# Patient Record
Sex: MALE | Race: BLACK OR AFRICAN AMERICAN | NOT HISPANIC OR LATINO | URBAN - METROPOLITAN AREA
[De-identification: names, ages, dates, MRNs, and addresses within clinical notes are randomized per-mention and may not be internally consistent; named-entity substitution may affect disease eponyms.]

---

## 2024-04-30 ENCOUNTER — INPATIENT (INPATIENT)
Facility: HOSPITAL | Age: 59
LOS: 6 days | Discharge: HOME | DRG: 177 | End: 2024-05-07
Attending: STUDENT IN AN ORGANIZED HEALTH CARE EDUCATION/TRAINING PROGRAM | Admitting: INTERNAL MEDICINE
Payer: SELF-PAY

## 2024-04-30 VITALS
HEART RATE: 78 BPM | TEMPERATURE: 98 F | WEIGHT: 300.05 LBS | SYSTOLIC BLOOD PRESSURE: 114 MMHG | RESPIRATION RATE: 27 BRPM | DIASTOLIC BLOOD PRESSURE: 61 MMHG | OXYGEN SATURATION: 76 %

## 2024-04-30 DIAGNOSIS — N28.1 CYST OF KIDNEY, ACQUIRED: ICD-10-CM

## 2024-04-30 DIAGNOSIS — I10 ESSENTIAL (PRIMARY) HYPERTENSION: ICD-10-CM

## 2024-04-30 DIAGNOSIS — J45.901 UNSPECIFIED ASTHMA WITH (ACUTE) EXACERBATION: ICD-10-CM

## 2024-04-30 DIAGNOSIS — T39.395A ADVERSE EFFECT OF OTHER NONSTEROIDAL ANTI-INFLAMMATORY DRUGS [NSAID], INITIAL ENCOUNTER: ICD-10-CM

## 2024-04-30 DIAGNOSIS — N17.9 ACUTE KIDNEY FAILURE, UNSPECIFIED: ICD-10-CM

## 2024-04-30 DIAGNOSIS — D50.9 IRON DEFICIENCY ANEMIA, UNSPECIFIED: ICD-10-CM

## 2024-04-30 DIAGNOSIS — E78.5 HYPERLIPIDEMIA, UNSPECIFIED: ICD-10-CM

## 2024-04-30 DIAGNOSIS — E87.29 OTHER ACIDOSIS: ICD-10-CM

## 2024-04-30 DIAGNOSIS — D72.10 EOSINOPHILIA, UNSPECIFIED: ICD-10-CM

## 2024-04-30 DIAGNOSIS — J18.8 OTHER PNEUMONIA, UNSPECIFIED ORGANISM: ICD-10-CM

## 2024-04-30 DIAGNOSIS — E66.2 MORBID (SEVERE) OBESITY WITH ALVEOLAR HYPOVENTILATION: ICD-10-CM

## 2024-04-30 DIAGNOSIS — J15.69 PNEUMONIA DUE TO OTHER GRAM-NEGATIVE BACTERIA: ICD-10-CM

## 2024-04-30 DIAGNOSIS — Z87.891 PERSONAL HISTORY OF NICOTINE DEPENDENCE: ICD-10-CM

## 2024-04-30 DIAGNOSIS — I27.20 PULMONARY HYPERTENSION, UNSPECIFIED: ICD-10-CM

## 2024-04-30 DIAGNOSIS — J96.02 ACUTE RESPIRATORY FAILURE WITH HYPERCAPNIA: ICD-10-CM

## 2024-04-30 DIAGNOSIS — J96.01 ACUTE RESPIRATORY FAILURE WITH HYPOXIA: ICD-10-CM

## 2024-04-30 LAB
ALBUMIN SERPL ELPH-MCNC: 3.5 G/DL — SIGNIFICANT CHANGE UP (ref 3.4–5)
ALP SERPL-CCNC: 82 U/L — SIGNIFICANT CHANGE UP (ref 40–120)
ALT FLD-CCNC: 6 U/L — LOW (ref 12–42)
ANION GAP SERPL CALC-SCNC: 5 MMOL/L — LOW (ref 9–16)
ANISOCYTOSIS BLD QL: SIGNIFICANT CHANGE UP
APTT BLD: 28.2 SEC — SIGNIFICANT CHANGE UP (ref 24.5–35.6)
AST SERPL-CCNC: 16 U/L — SIGNIFICANT CHANGE UP (ref 15–37)
BASOPHILS # BLD AUTO: 0.08 K/UL — SIGNIFICANT CHANGE UP (ref 0–0.2)
BASOPHILS NFR BLD AUTO: 0.7 % — SIGNIFICANT CHANGE UP (ref 0–2)
BILIRUB SERPL-MCNC: 0.6 MG/DL — SIGNIFICANT CHANGE UP (ref 0.2–1.2)
BUN SERPL-MCNC: 17 MG/DL — SIGNIFICANT CHANGE UP (ref 7–23)
CALCIUM SERPL-MCNC: 8.7 MG/DL — SIGNIFICANT CHANGE UP (ref 8.5–10.5)
CHLORIDE SERPL-SCNC: 102 MMOL/L — SIGNIFICANT CHANGE UP (ref 96–108)
CO2 SERPL-SCNC: 33 MMOL/L — HIGH (ref 22–31)
CREAT SERPL-MCNC: 1.59 MG/DL — HIGH (ref 0.5–1.3)
DACRYOCYTES BLD QL SMEAR: SLIGHT — SIGNIFICANT CHANGE UP
EGFR: 49 ML/MIN/1.73M2 — LOW
ELLIPTOCYTES BLD QL SMEAR: SIGNIFICANT CHANGE UP
EOSINOPHIL # BLD AUTO: 0.94 K/UL — HIGH (ref 0–0.5)
EOSINOPHIL NFR BLD AUTO: 7.8 % — HIGH (ref 0–6)
FLUAV AG NPH QL: SIGNIFICANT CHANGE UP
FLUBV AG NPH QL: SIGNIFICANT CHANGE UP
GLUCOSE SERPL-MCNC: 110 MG/DL — HIGH (ref 70–99)
HCT VFR BLD CALC: 31.2 % — LOW (ref 39–50)
HGB BLD-MCNC: 7.7 G/DL — LOW (ref 13–17)
HYPOCHROMIA BLD QL: SLIGHT — SIGNIFICANT CHANGE UP
IMM GRANULOCYTES NFR BLD AUTO: 0.3 % — SIGNIFICANT CHANGE UP (ref 0–0.9)
INR BLD: 1.16 — SIGNIFICANT CHANGE UP (ref 0.85–1.18)
LACTATE BLDV-MCNC: 0.9 MMOL/L — SIGNIFICANT CHANGE UP (ref 0.5–2)
LACTATE BLDV-MCNC: 0.9 MMOL/L — SIGNIFICANT CHANGE UP (ref 0.5–2)
LYMPHOCYTES # BLD AUTO: 1.26 K/UL — SIGNIFICANT CHANGE UP (ref 1–3.3)
LYMPHOCYTES # BLD AUTO: 10.5 % — LOW (ref 13–44)
MAGNESIUM SERPL-MCNC: 2.5 MG/DL — SIGNIFICANT CHANGE UP (ref 1.6–2.6)
MANUAL SMEAR VERIFICATION: SIGNIFICANT CHANGE UP
MCHC RBC-ENTMCNC: 16 PG — LOW (ref 27–34)
MCHC RBC-ENTMCNC: 24.7 GM/DL — LOW (ref 32–36)
MCV RBC AUTO: 65 FL — LOW (ref 80–100)
MONOCYTES # BLD AUTO: 1.15 K/UL — HIGH (ref 0–0.9)
MONOCYTES NFR BLD AUTO: 9.6 % — SIGNIFICANT CHANGE UP (ref 2–14)
NEUTROPHILS # BLD AUTO: 8.52 K/UL — HIGH (ref 1.8–7.4)
NEUTROPHILS NFR BLD AUTO: 71.1 % — SIGNIFICANT CHANGE UP (ref 43–77)
NRBC # BLD: 0 /100 WBCS — SIGNIFICANT CHANGE UP (ref 0–0)
NT-PROBNP SERPL-SCNC: 323 PG/ML — HIGH
PCO2 BLDV: 68 MMHG — HIGH (ref 42–55)
PCO2 BLDV: 69 MMHG — HIGH (ref 42–55)
PCO2 BLDV: 75 MMHG — HIGH (ref 42–55)
PCO2 BLDV: 78 MMHG — HIGH (ref 42–55)
PCO2 BLDV: 84 MMHG — HIGH (ref 42–55)
PH BLDV: 7.23 — LOW (ref 7.32–7.43)
PH BLDV: 7.25 — LOW (ref 7.32–7.43)
PH BLDV: 7.26 — LOW (ref 7.32–7.43)
PH BLDV: 7.3 — LOW (ref 7.32–7.43)
PH BLDV: 7.34 — SIGNIFICANT CHANGE UP (ref 7.32–7.43)
PLAT MORPH BLD: NORMAL — SIGNIFICANT CHANGE UP
PLATELET # BLD AUTO: 223 K/UL — SIGNIFICANT CHANGE UP (ref 150–400)
PO2 BLDV: 37 MMHG — SIGNIFICANT CHANGE UP (ref 25–45)
PO2 BLDV: 46 MMHG — HIGH (ref 25–45)
PO2 BLDV: 46 MMHG — HIGH (ref 25–45)
PO2 BLDV: 71 MMHG — HIGH (ref 25–45)
PO2 BLDV: 84 MMHG — HIGH (ref 25–45)
POTASSIUM SERPL-MCNC: 3.4 MMOL/L — LOW (ref 3.5–5.3)
POTASSIUM SERPL-SCNC: 3.4 MMOL/L — LOW (ref 3.5–5.3)
PROT SERPL-MCNC: 8 G/DL — SIGNIFICANT CHANGE UP (ref 6.4–8.2)
PROTHROM AB SERPL-ACNC: 12.7 SEC — SIGNIFICANT CHANGE UP (ref 9.5–13)
RBC # BLD: 4.8 M/UL — SIGNIFICANT CHANGE UP (ref 4.2–5.8)
RBC # FLD: 25.1 % — HIGH (ref 10.3–14.5)
RBC BLD AUTO: ABNORMAL
RSV RNA NPH QL NAA+NON-PROBE: SIGNIFICANT CHANGE UP
SAO2 % BLDV: 62.7 % — LOW (ref 67–88)
SAO2 % BLDV: 72 % — SIGNIFICANT CHANGE UP (ref 67–88)
SAO2 % BLDV: 74.5 % — SIGNIFICANT CHANGE UP (ref 67–88)
SAO2 % BLDV: 92.7 % — HIGH (ref 67–88)
SAO2 % BLDV: 97 % — HIGH (ref 67–88)
SARS-COV-2 RNA SPEC QL NAA+PROBE: SIGNIFICANT CHANGE UP
SODIUM SERPL-SCNC: 140 MMOL/L — SIGNIFICANT CHANGE UP (ref 132–145)
TROPONIN I, HIGH SENSITIVITY RESULT: 15.7 NG/L — SIGNIFICANT CHANGE UP
WBC # BLD: 11.99 K/UL — HIGH (ref 3.8–10.5)
WBC # FLD AUTO: 11.99 K/UL — HIGH (ref 3.8–10.5)

## 2024-04-30 PROCEDURE — 99285 EMERGENCY DEPT VISIT HI MDM: CPT

## 2024-04-30 PROCEDURE — G0508: CPT

## 2024-04-30 PROCEDURE — 71275 CT ANGIOGRAPHY CHEST: CPT | Mod: 26,MC

## 2024-04-30 PROCEDURE — 71045 X-RAY EXAM CHEST 1 VIEW: CPT | Mod: 26

## 2024-04-30 RX ORDER — IPRATROPIUM/ALBUTEROL SULFATE 18-103MCG
3 AEROSOL WITH ADAPTER (GRAM) INHALATION ONCE
Refills: 0 | Status: DISCONTINUED | OUTPATIENT
Start: 2024-04-30 | End: 2024-04-30

## 2024-04-30 RX ORDER — ALBUTEROL 90 UG/1
2.5 AEROSOL, METERED ORAL
Refills: 0 | Status: COMPLETED | OUTPATIENT
Start: 2024-04-30 | End: 2024-04-30

## 2024-04-30 RX ORDER — FUROSEMIDE 40 MG
40 TABLET ORAL ONCE
Refills: 0 | Status: COMPLETED | OUTPATIENT
Start: 2024-04-30 | End: 2024-04-30

## 2024-04-30 RX ORDER — CEFTRIAXONE 500 MG/1
1000 INJECTION, POWDER, FOR SOLUTION INTRAMUSCULAR; INTRAVENOUS ONCE
Refills: 0 | Status: COMPLETED | OUTPATIENT
Start: 2024-04-30 | End: 2024-04-30

## 2024-04-30 RX ORDER — IPRATROPIUM/ALBUTEROL SULFATE 18-103MCG
3 AEROSOL WITH ADAPTER (GRAM) INHALATION
Refills: 0 | Status: COMPLETED | OUTPATIENT
Start: 2024-04-30 | End: 2024-04-30

## 2024-04-30 RX ORDER — ALBUTEROL 90 UG/1
2.5 AEROSOL, METERED ORAL ONCE
Refills: 0 | Status: DISCONTINUED | OUTPATIENT
Start: 2024-04-30 | End: 2024-04-30

## 2024-04-30 RX ORDER — AZITHROMYCIN 500 MG/1
500 TABLET, FILM COATED ORAL ONCE
Refills: 0 | Status: COMPLETED | OUTPATIENT
Start: 2024-04-30 | End: 2024-04-30

## 2024-04-30 RX ADMIN — ALBUTEROL 2.5 MILLIGRAM(S): 90 AEROSOL, METERED ORAL at 13:42

## 2024-04-30 RX ADMIN — AZITHROMYCIN 255 MILLIGRAM(S): 500 TABLET, FILM COATED ORAL at 18:13

## 2024-04-30 RX ADMIN — ALBUTEROL 2.5 MILLIGRAM(S): 90 AEROSOL, METERED ORAL at 15:01

## 2024-04-30 RX ADMIN — Medication 2 MILLILITER(S): at 16:20

## 2024-04-30 RX ADMIN — CEFTRIAXONE 100 MILLIGRAM(S): 500 INJECTION, POWDER, FOR SOLUTION INTRAMUSCULAR; INTRAVENOUS at 17:34

## 2024-04-30 RX ADMIN — ALBUTEROL 2.5 MILLIGRAM(S): 90 AEROSOL, METERED ORAL at 14:30

## 2024-04-30 RX ADMIN — ALBUTEROL 2.5 MILLIGRAM(S): 90 AEROSOL, METERED ORAL at 14:04

## 2024-04-30 RX ADMIN — CEFTRIAXONE 1000 MILLIGRAM(S): 500 INJECTION, POWDER, FOR SOLUTION INTRAMUSCULAR; INTRAVENOUS at 18:15

## 2024-04-30 RX ADMIN — ALBUTEROL 2.5 MILLIGRAM(S): 90 AEROSOL, METERED ORAL at 15:24

## 2024-04-30 RX ADMIN — ALBUTEROL 2.5 MILLIGRAM(S): 90 AEROSOL, METERED ORAL at 15:18

## 2024-04-30 RX ADMIN — Medication 40 MILLIGRAM(S): at 16:45

## 2024-04-30 RX ADMIN — Medication 2 MILLILITER(S): at 16:36

## 2024-04-30 NOTE — ED ADULT NURSE REASSESSMENT NOTE - NS ED NURSE REASSESS COMMENT FT1
Pt is A&Ox4 and shows improvement in neurological status at this time. Pt is awake and states having urge to urinate at this time; pt offered urinal. Respirations equal and unlabored at this time.

## 2024-04-30 NOTE — ED ADULT NURSE REASSESSMENT NOTE - NS ED NURSE REASSESS COMMENT FT1
Pt received from DAYANARA Alston. Pt is A&Ox4 at this time but sleepy. Pt maintained on BIPAP and tele-ICU monitoring. Respirations equal and unlabored. VSS

## 2024-04-30 NOTE — ED PROVIDER NOTE - PROGRESS NOTE DETAILS
Patient reevaluated at the bedside, gas is improved, patient is awake, opening eyes spontaneously, was able to speak a few words, confirming that he is feeling better, nodding his head.  Following commands.  Vitals are stable.  Patient is stable for stepdown.

## 2024-04-30 NOTE — ED ADULT NURSE REASSESSMENT NOTE - NS ED NURSE REASSESS COMMENT FT1
pt report received from DAYANARA Yun, PT sleeping in stretcher, even rise and fall of chest. Pt pending admission to ICU. Tele icu huddle given by RN Court Jo  and currently utilizing tele-ICU for pt monitoring. Pt remains on BiPAP; settings recorded. VSS pending repeat blood work at 1830.  Care ongoing.

## 2024-04-30 NOTE — PROVIDER CONTACT NOTE (EICU) - ACTION/TREATMENT ORDERED:
Follow up in 30 minutes with ABG. Follow up in 30 minutes with ABG.  This patient is critically ill and required frequent bedside visits with therapy change. I have personally provided 60 minutes of critical care time. This time excludes spent teaching. Service and consultation provided via TeleICU service. This provider was located at [facility] and support provided remotely.

## 2024-04-30 NOTE — ED PROVIDER NOTE - CLINICAL SUMMARY MEDICAL DECISION MAKING FREE TEXT BOX
Patient here with history of asthma and long-term smoking history now coming in for diffuse wheezing and worsening asthma symptoms over the last several days requiring nebulizers, steroids, magnesium in the field.  Patient requiring multiple rounds of nebulizers here in the emergency room.  Patient becoming more somnolent later in the shift with hypercapnia on repeat VBG.  Requiring BiPAP support.  Endorsed to MICU team advised on BiPAP settings advised reassess with adjusted BiPAP settings.  eICU involved as well for optimization of BiPAP settings.  Patient pending VBG

## 2024-04-30 NOTE — ED PROVIDER NOTE - ATTENDING APP SHARED VISIT CONTRIBUTION OF CARE
The patient was seen immediately upon arrival due to a high probability of imminent or life-threatening deterioration secondary to COPD exacerbation with retention, which required my direct attention, intervention, and personal management at the bedside. I have personally provided critical care time exclusive of time spent on separately billable procedures. Time includes review of laboratory data, radiology results, discussion with consultants, discussion with the patient's family, and monitoring for potential decompensation.

## 2024-04-30 NOTE — ED ADULT NURSE NOTE - CHIEF COMPLAINT QUOTE
Family at bedside. Updated on status. Pt requesting discharge. Notified waiting for radiology report. here for sob, wheezing and productive cough - was found to be hypoxic - was given 12mg of Decadron, 2 grams of Magnesium, 2 Duo Nebs- pt is hypoxic on arrival  and is 80% on RA.

## 2024-04-30 NOTE — ED ADULT NURSE NOTE - OBJECTIVE STATEMENT
Pt presents to ED A&Ox4 with c/o SOB. Pt is  for [place] and reports experiencing SOB while walking during work. PMH bronchitis as per pt; prescribed albuterol inhaler. Pt reports using inhaler x3 with no relief and calling 911. Denies fever/chills. Pt given IV magnesium, steroids, and nebulizer treatments during EMS transport. Pt Pt presents to ED A&Ox4 with c/o SOB. Pt is  for [place] [employer] and reports experiencing SOB while walking during work. PMH bronchitis as per pt; prescribed albuterol inhaler. Pt reports using inhaler x3 with no relief and calling 911. Denies fever/chills. Pt given IV magnesium, steroids, and nebulizer treatments during EMS transport. Pt respirations equal but labored. Increased work of breathing and retractions noted. Skin is warm, dry, and diaphoretic.

## 2024-04-30 NOTE — PROVIDER CONTACT NOTE (EICU) - RECOMMENDATIONS
In discussion with the primary team recommended switching over modalities to AVAPS.  Set tidal volume to 500 mL based on patient's ideal body weight.  Repeat arterial blood gas in 30 minutes to reassess.  Given the patient's elevated bicarbonate on he still maintains a mental status most likely the patient is a chronic retainer, and we should aim for a higher pCO2 goal for him.  CT chest also shows a right lower lobe consolidation most likely due to pneumonia.  Will continue with antibiotics and airway clearance and bronchodilators.

## 2024-04-30 NOTE — PROVIDER CONTACT NOTE (EICU) - BACKGROUND
59-year-old male presenting with shortness of breath wheezing and productive cough.  Found to be in hypoxic respiratory failure and placed on BiPAP for increased work of breathing.  VBG found the patient to be acidotic with an elevated pCO2 of 68.  On repeat gases patient's carbon dioxide continue to rise despite being on noninvasive therapy with bilevel ventilation.

## 2024-04-30 NOTE — ED PROVIDER NOTE - OBJECTIVE STATEMENT
59-year-old male history of asthma now coming in for several days of cough and congestion stating his asthma has been worsening.  Denies any history of intubations.  History of smoking.  Denies nausea, vomiting, fevers, chills.  Given nebulizers, steroids and magnesium in the field

## 2024-04-30 NOTE — ED ADULT TRIAGE NOTE - NS ED NURSE BANDS TYPE
Final Anesthesia Post-op Assessment    Patient: Iza Morejon  Procedure(s) Performed: DRAINAGE OF SEPTO HEMATOMA  Anesthesia type: General    Vital Last Value   Temperature 36.9 °C (98.4 °F) (12/24/18 1653)   Pulse 117 (12/24/18 1653)   Respiratory Rate (P) 16 (12/24/18 1700)   Non-Invasive   Blood Pressure (P) 140/78 (12/24/18 1700)   Arterial  Blood Pressure     Pulse Oximetry 99 % (12/24/18 1653)     Last 24 I/O:     Intake/Output Summary (Last 24 hours) at 12/24/2018 1703  Last data filed at 12/24/2018 1640  Gross per 24 hour   Intake 1000 ml   Output --   Net 1000 ml       PATIENT LOCATION: PACU Phase 1  POST-OP VITAL SIGNS: stable  LEVEL OF CONSCIOUSNESS: participates in exam, awake, oriented, answers questions appropriately and alert  RESPIRATORY STATUS: spontaneous ventilation  CARDIOVASCULAR: stable  HYDRATION: euvolemic    PAIN MANAGEMENT: adequately controlled  NAUSEA: None  AIRWAY PATENCY: patent  POST-OP ASSESSMENT: no complications, patient tolerated procedure well with no complications, no evidence of recall and sufficiently recovered from acute administration of anesthesia effects and able to participate in evaluation  COMPLICATIONS: none  HANDOFF:  Handoff to receiving nurse was performed and questions were answered       Name band;

## 2024-04-30 NOTE — ED PROVIDER NOTE - CARE PLAN
1 Principal Discharge DX:	COPD exacerbation   Principal Discharge DX:	COPD exacerbation  Secondary Diagnosis:	CO2 retention

## 2024-04-30 NOTE — ED ADULT NURSE REASSESSMENT NOTE - NS ED NURSE REASSESS COMMENT FT1
Tele-ICU consult performed with Dr. Hadley at bedside and BIPAP settings changed. Pt placed on constant monitoring with tele-ICU nurse.

## 2024-04-30 NOTE — ED ADULT TRIAGE NOTE - CHIEF COMPLAINT QUOTE
here for sob, wheezing and productive cough - was found to be hypoxic - was given 12mg of Decadron, 2 grams of Magnesium, 2 Duo Nebs- pt is hypoxic on arrival  and is 80% on RA.

## 2024-05-01 DIAGNOSIS — I10 ESSENTIAL (PRIMARY) HYPERTENSION: ICD-10-CM

## 2024-05-01 DIAGNOSIS — Z29.9 ENCOUNTER FOR PROPHYLACTIC MEASURES, UNSPECIFIED: ICD-10-CM

## 2024-05-01 DIAGNOSIS — N17.9 ACUTE KIDNEY FAILURE, UNSPECIFIED: ICD-10-CM

## 2024-05-01 DIAGNOSIS — J45.901 UNSPECIFIED ASTHMA WITH (ACUTE) EXACERBATION: ICD-10-CM

## 2024-05-01 DIAGNOSIS — D64.9 ANEMIA, UNSPECIFIED: ICD-10-CM

## 2024-05-01 DIAGNOSIS — J18.9 PNEUMONIA, UNSPECIFIED ORGANISM: ICD-10-CM

## 2024-05-01 DIAGNOSIS — E78.5 HYPERLIPIDEMIA, UNSPECIFIED: ICD-10-CM

## 2024-05-01 LAB
ADD ON TEST-SPECIMEN IN LAB: SIGNIFICANT CHANGE UP
ALBUMIN SERPL ELPH-MCNC: 3.8 G/DL — SIGNIFICANT CHANGE UP (ref 3.3–5)
ALP SERPL-CCNC: 82 U/L — SIGNIFICANT CHANGE UP (ref 40–120)
ALT FLD-CCNC: 7 U/L — LOW (ref 10–45)
ANION GAP SERPL CALC-SCNC: 11 MMOL/L — SIGNIFICANT CHANGE UP (ref 5–17)
ANION GAP SERPL CALC-SCNC: 11 MMOL/L — SIGNIFICANT CHANGE UP (ref 5–17)
ANION GAP SERPL CALC-SCNC: 12 MMOL/L — SIGNIFICANT CHANGE UP (ref 5–17)
ANISOCYTOSIS BLD QL: SIGNIFICANT CHANGE UP
APPEARANCE UR: CLEAR — SIGNIFICANT CHANGE UP
AST SERPL-CCNC: 15 U/L — SIGNIFICANT CHANGE UP (ref 10–40)
BACTERIA # UR AUTO: NEGATIVE /HPF — SIGNIFICANT CHANGE UP
BASE EXCESS BLDA CALC-SCNC: 4.7 MMOL/L — HIGH (ref -2–3)
BASOPHILS # BLD AUTO: 0 K/UL — SIGNIFICANT CHANGE UP (ref 0–0.2)
BASOPHILS # BLD AUTO: 0.02 K/UL — SIGNIFICANT CHANGE UP (ref 0–0.2)
BASOPHILS NFR BLD AUTO: 0 % — SIGNIFICANT CHANGE UP (ref 0–2)
BASOPHILS NFR BLD AUTO: 0.1 % — SIGNIFICANT CHANGE UP (ref 0–2)
BILIRUB SERPL-MCNC: 0.3 MG/DL — SIGNIFICANT CHANGE UP (ref 0.2–1.2)
BILIRUB UR-MCNC: NEGATIVE — SIGNIFICANT CHANGE UP
BLD GP AB SCN SERPL QL: NEGATIVE — SIGNIFICANT CHANGE UP
BUN SERPL-MCNC: 21 MG/DL — SIGNIFICANT CHANGE UP (ref 7–23)
BUN SERPL-MCNC: 22 MG/DL — SIGNIFICANT CHANGE UP (ref 7–23)
BUN SERPL-MCNC: 24 MG/DL — HIGH (ref 7–23)
CALCIUM SERPL-MCNC: 8.8 MG/DL — SIGNIFICANT CHANGE UP (ref 8.4–10.5)
CALCIUM SERPL-MCNC: 8.9 MG/DL — SIGNIFICANT CHANGE UP (ref 8.4–10.5)
CALCIUM SERPL-MCNC: 9 MG/DL — SIGNIFICANT CHANGE UP (ref 8.4–10.5)
CAST: 0 /LPF — SIGNIFICANT CHANGE UP (ref 0–4)
CHLORIDE SERPL-SCNC: 100 MMOL/L — SIGNIFICANT CHANGE UP (ref 96–108)
CHLORIDE SERPL-SCNC: 98 MMOL/L — SIGNIFICANT CHANGE UP (ref 96–108)
CHLORIDE SERPL-SCNC: 99 MMOL/L — SIGNIFICANT CHANGE UP (ref 96–108)
CO2 BLDA-SCNC: 38 MMOL/L — HIGH (ref 19–24)
CO2 SERPL-SCNC: 30 MMOL/L — SIGNIFICANT CHANGE UP (ref 22–31)
CO2 SERPL-SCNC: 30 MMOL/L — SIGNIFICANT CHANGE UP (ref 22–31)
CO2 SERPL-SCNC: 31 MMOL/L — SIGNIFICANT CHANGE UP (ref 22–31)
COLOR SPEC: YELLOW — SIGNIFICANT CHANGE UP
CREAT ?TM UR-MCNC: 16 MG/DL — SIGNIFICANT CHANGE UP
CREAT SERPL-MCNC: 2.06 MG/DL — HIGH (ref 0.5–1.3)
CREAT SERPL-MCNC: 2.06 MG/DL — HIGH (ref 0.5–1.3)
CREAT SERPL-MCNC: 2.09 MG/DL — HIGH (ref 0.5–1.3)
DIFF PNL FLD: NEGATIVE — SIGNIFICANT CHANGE UP
EGFR: 35 ML/MIN/1.73M2 — LOW
EGFR: 36 ML/MIN/1.73M2 — LOW
EGFR: 36 ML/MIN/1.73M2 — LOW
EOSINOPHIL # BLD AUTO: 0 K/UL — SIGNIFICANT CHANGE UP (ref 0–0.5)
EOSINOPHIL # BLD AUTO: 0 K/UL — SIGNIFICANT CHANGE UP (ref 0–0.5)
EOSINOPHIL NFR BLD AUTO: 0 % — SIGNIFICANT CHANGE UP (ref 0–6)
EOSINOPHIL NFR BLD AUTO: 0 % — SIGNIFICANT CHANGE UP (ref 0–6)
FERRITIN SERPL-MCNC: 5 NG/ML — LOW (ref 30–400)
GAS PNL BLDA: SIGNIFICANT CHANGE UP
GLUCOSE BLDC GLUCOMTR-MCNC: 135 MG/DL — HIGH (ref 70–99)
GLUCOSE BLDC GLUCOMTR-MCNC: 143 MG/DL — HIGH (ref 70–99)
GLUCOSE BLDC GLUCOMTR-MCNC: 147 MG/DL — HIGH (ref 70–99)
GLUCOSE BLDC GLUCOMTR-MCNC: 150 MG/DL — HIGH (ref 70–99)
GLUCOSE SERPL-MCNC: 125 MG/DL — HIGH (ref 70–99)
GLUCOSE SERPL-MCNC: 129 MG/DL — HIGH (ref 70–99)
GLUCOSE SERPL-MCNC: 138 MG/DL — HIGH (ref 70–99)
GLUCOSE UR QL: NEGATIVE MG/DL — SIGNIFICANT CHANGE UP
HCO3 BLDA-SCNC: 35 MMOL/L — HIGH (ref 21–28)
HCT VFR BLD CALC: 31.6 % — LOW (ref 39–50)
HCT VFR BLD CALC: 32.1 % — LOW (ref 39–50)
HGB BLD-MCNC: 7.6 G/DL — LOW (ref 13–17)
HGB BLD-MCNC: 7.6 G/DL — LOW (ref 13–17)
HYPOCHROMIA BLD QL: SIGNIFICANT CHANGE UP
IMM GRANULOCYTES NFR BLD AUTO: 1.7 % — HIGH (ref 0–0.9)
IRON SATN MFR SERPL: 17 UG/DL — LOW (ref 45–165)
IRON SATN MFR SERPL: 5 % — LOW (ref 16–55)
KETONES UR-MCNC: NEGATIVE MG/DL — SIGNIFICANT CHANGE UP
LEUKOCYTE ESTERASE UR-ACNC: ABNORMAL
LYMPHOCYTES # BLD AUTO: 0.36 K/UL — LOW (ref 1–3.3)
LYMPHOCYTES # BLD AUTO: 0.39 K/UL — LOW (ref 1–3.3)
LYMPHOCYTES # BLD AUTO: 2.5 % — LOW (ref 13–44)
LYMPHOCYTES # BLD AUTO: 2.6 % — LOW (ref 13–44)
MACROCYTES BLD QL: SLIGHT — SIGNIFICANT CHANGE UP
MAGNESIUM SERPL-MCNC: 2.4 MG/DL — SIGNIFICANT CHANGE UP (ref 1.6–2.6)
MAGNESIUM SERPL-MCNC: 2.5 MG/DL — SIGNIFICANT CHANGE UP (ref 1.6–2.6)
MANUAL SMEAR VERIFICATION: SIGNIFICANT CHANGE UP
MCHC RBC-ENTMCNC: 16 PG — LOW (ref 27–34)
MCHC RBC-ENTMCNC: 16 PG — LOW (ref 27–34)
MCHC RBC-ENTMCNC: 23.7 GM/DL — LOW (ref 32–36)
MCHC RBC-ENTMCNC: 24.1 GM/DL — LOW (ref 32–36)
MCV RBC AUTO: 66.5 FL — LOW (ref 80–100)
MCV RBC AUTO: 67.6 FL — LOW (ref 80–100)
MICROCYTES BLD QL: SIGNIFICANT CHANGE UP
MONOCYTES # BLD AUTO: 0.14 K/UL — SIGNIFICANT CHANGE UP (ref 0–0.9)
MONOCYTES # BLD AUTO: 0.23 K/UL — SIGNIFICANT CHANGE UP (ref 0–0.9)
MONOCYTES NFR BLD AUTO: 0.9 % — LOW (ref 2–14)
MONOCYTES NFR BLD AUTO: 1.6 % — LOW (ref 2–14)
NEUTROPHILS # BLD AUTO: 13.64 K/UL — HIGH (ref 1.8–7.4)
NEUTROPHILS # BLD AUTO: 14.57 K/UL — HIGH (ref 1.8–7.4)
NEUTROPHILS NFR BLD AUTO: 94.1 % — HIGH (ref 43–77)
NEUTROPHILS NFR BLD AUTO: 94.8 % — HIGH (ref 43–77)
NEUTS BAND # BLD: 1.7 % — SIGNIFICANT CHANGE UP (ref 0–8)
NITRITE UR-MCNC: NEGATIVE — SIGNIFICANT CHANGE UP
NRBC # BLD: 0 /100 WBCS — SIGNIFICANT CHANGE UP (ref 0–0)
OVALOCYTES BLD QL SMEAR: SLIGHT — SIGNIFICANT CHANGE UP
PCO2 BLDA: 84 MMHG — CRITICAL HIGH (ref 35–48)
PH BLDA: 7.23 — LOW (ref 7.35–7.45)
PH UR: 6 — SIGNIFICANT CHANGE UP (ref 5–8)
PHOSPHATE SERPL-MCNC: 6.8 MG/DL — HIGH (ref 2.5–4.5)
PLAT MORPH BLD: ABNORMAL
PLATELET # BLD AUTO: 225 K/UL — SIGNIFICANT CHANGE UP (ref 150–400)
PLATELET # BLD AUTO: 233 K/UL — SIGNIFICANT CHANGE UP (ref 150–400)
PO2 BLDA: 84 MMHG — SIGNIFICANT CHANGE UP (ref 83–108)
POIKILOCYTOSIS BLD QL AUTO: SLIGHT — SIGNIFICANT CHANGE UP
POLYCHROMASIA BLD QL SMEAR: SLIGHT — SIGNIFICANT CHANGE UP
POTASSIUM SERPL-MCNC: 4.1 MMOL/L — SIGNIFICANT CHANGE UP (ref 3.5–5.3)
POTASSIUM SERPL-MCNC: 4.5 MMOL/L — SIGNIFICANT CHANGE UP (ref 3.5–5.3)
POTASSIUM SERPL-MCNC: 4.6 MMOL/L — SIGNIFICANT CHANGE UP (ref 3.5–5.3)
POTASSIUM SERPL-SCNC: 4.1 MMOL/L — SIGNIFICANT CHANGE UP (ref 3.5–5.3)
POTASSIUM SERPL-SCNC: 4.5 MMOL/L — SIGNIFICANT CHANGE UP (ref 3.5–5.3)
POTASSIUM SERPL-SCNC: 4.6 MMOL/L — SIGNIFICANT CHANGE UP (ref 3.5–5.3)
POTASSIUM UR-SCNC: 3 MMOL/L — SIGNIFICANT CHANGE UP
PROCALCITONIN SERPL-MCNC: 0.42 NG/ML — HIGH (ref 0.02–0.1)
PROT SERPL-MCNC: 7.3 G/DL — SIGNIFICANT CHANGE UP (ref 6–8.3)
PROT UR-MCNC: NEGATIVE MG/DL — SIGNIFICANT CHANGE UP
RBC # BLD: 4.75 M/UL — SIGNIFICANT CHANGE UP (ref 4.2–5.8)
RBC # FLD: 25.4 % — HIGH (ref 10.3–14.5)
RBC # FLD: 25.4 % — HIGH (ref 10.3–14.5)
RBC BLD AUTO: ABNORMAL
RBC CASTS # UR COMP ASSIST: 0 /HPF — SIGNIFICANT CHANGE UP (ref 0–4)
RETICS #: 82.6 K/UL — SIGNIFICANT CHANGE UP (ref 25–125)
RETICS/RBC NFR: 1.7 % — SIGNIFICANT CHANGE UP (ref 0.5–2.5)
RH IG SCN BLD-IMP: POSITIVE — SIGNIFICANT CHANGE UP
S PNEUM AG UR QL: NEGATIVE — SIGNIFICANT CHANGE UP
SAO2 % BLDA: 98.4 % — HIGH (ref 94–98)
SODIUM SERPL-SCNC: 140 MMOL/L — SIGNIFICANT CHANGE UP (ref 135–145)
SODIUM SERPL-SCNC: 140 MMOL/L — SIGNIFICANT CHANGE UP (ref 135–145)
SODIUM SERPL-SCNC: 142 MMOL/L — SIGNIFICANT CHANGE UP (ref 135–145)
SODIUM UR-SCNC: 20 MMOL/L — SIGNIFICANT CHANGE UP
SP GR SPEC: <1.005 — LOW (ref 1–1.03)
SPHEROCYTES BLD QL SMEAR: SLIGHT — SIGNIFICANT CHANGE UP
SQUAMOUS # UR AUTO: 1 /HPF — SIGNIFICANT CHANGE UP (ref 0–5)
STOMATOCYTES BLD QL SMEAR: SLIGHT — SIGNIFICANT CHANGE UP
TARGETS BLD QL SMEAR: SLIGHT — SIGNIFICANT CHANGE UP
TIBC SERPL-MCNC: 357 UG/DL — SIGNIFICANT CHANGE UP (ref 220–430)
TRANSFERRIN SERPL-MCNC: 303 MG/DL — SIGNIFICANT CHANGE UP (ref 200–360)
TSH SERPL-MCNC: 0.17 UIU/ML — LOW (ref 0.27–4.2)
UIBC SERPL-MCNC: 340 UG/DL — SIGNIFICANT CHANGE UP (ref 110–370)
UROBILINOGEN FLD QL: 0.2 MG/DL — SIGNIFICANT CHANGE UP (ref 0.2–1)
UUN UR-MCNC: 31 MG/DL — SIGNIFICANT CHANGE UP
WBC # BLD: 14.5 K/UL — HIGH (ref 3.8–10.5)
WBC # BLD: 15.1 K/UL — HIGH (ref 3.8–10.5)
WBC # FLD AUTO: 14.5 K/UL — HIGH (ref 3.8–10.5)
WBC # FLD AUTO: 15.1 K/UL — HIGH (ref 3.8–10.5)
WBC UR QL: 1 /HPF — SIGNIFICANT CHANGE UP (ref 0–5)

## 2024-05-01 PROCEDURE — 99222 1ST HOSP IP/OBS MODERATE 55: CPT | Mod: GC

## 2024-05-01 PROCEDURE — 93010 ELECTROCARDIOGRAM REPORT: CPT

## 2024-05-01 PROCEDURE — 93306 TTE W/DOPPLER COMPLETE: CPT | Mod: 26

## 2024-05-01 RX ORDER — AZITHROMYCIN 500 MG/1
500 TABLET, FILM COATED ORAL EVERY 24 HOURS
Refills: 0 | Status: ACTIVE | OUTPATIENT
Start: 2024-05-01 | End: 2024-05-02

## 2024-05-01 RX ORDER — SODIUM CHLORIDE 9 MG/ML
1000 INJECTION, SOLUTION INTRAVENOUS
Refills: 0 | Status: DISCONTINUED | OUTPATIENT
Start: 2024-05-01 | End: 2024-05-07

## 2024-05-01 RX ORDER — SODIUM CHLORIDE 9 MG/ML
500 INJECTION, SOLUTION INTRAVENOUS
Refills: 0 | Status: DISCONTINUED | OUTPATIENT
Start: 2024-05-01 | End: 2024-05-02

## 2024-05-01 RX ORDER — CEFTRIAXONE 500 MG/1
1000 INJECTION, POWDER, FOR SOLUTION INTRAMUSCULAR; INTRAVENOUS EVERY 24 HOURS
Refills: 0 | Status: COMPLETED | OUTPATIENT
Start: 2024-05-01 | End: 2024-05-04

## 2024-05-01 RX ORDER — INFLUENZA VIRUS VACCINE 15; 15; 15; 15 UG/.5ML; UG/.5ML; UG/.5ML; UG/.5ML
0.5 SUSPENSION INTRAMUSCULAR ONCE
Refills: 0 | Status: DISCONTINUED | OUTPATIENT
Start: 2024-05-01 | End: 2024-05-07

## 2024-05-01 RX ORDER — INSULIN LISPRO 100/ML
VIAL (ML) SUBCUTANEOUS EVERY 6 HOURS
Refills: 0 | Status: DISCONTINUED | OUTPATIENT
Start: 2024-05-01 | End: 2024-05-07

## 2024-05-01 RX ORDER — DEXTROSE 50 % IN WATER 50 %
15 SYRINGE (ML) INTRAVENOUS ONCE
Refills: 0 | Status: DISCONTINUED | OUTPATIENT
Start: 2024-05-01 | End: 2024-05-07

## 2024-05-01 RX ORDER — IPRATROPIUM/ALBUTEROL SULFATE 18-103MCG
3 AEROSOL WITH ADAPTER (GRAM) INHALATION EVERY 6 HOURS
Refills: 0 | Status: DISCONTINUED | OUTPATIENT
Start: 2024-05-01 | End: 2024-05-06

## 2024-05-01 RX ORDER — DEXTROSE 50 % IN WATER 50 %
25 SYRINGE (ML) INTRAVENOUS ONCE
Refills: 0 | Status: DISCONTINUED | OUTPATIENT
Start: 2024-05-01 | End: 2024-05-07

## 2024-05-01 RX ORDER — HEPARIN SODIUM 5000 [USP'U]/ML
7500 INJECTION INTRAVENOUS; SUBCUTANEOUS EVERY 8 HOURS
Refills: 0 | Status: DISCONTINUED | OUTPATIENT
Start: 2024-05-01 | End: 2024-05-07

## 2024-05-01 RX ORDER — DEXTROSE 50 % IN WATER 50 %
12.5 SYRINGE (ML) INTRAVENOUS ONCE
Refills: 0 | Status: DISCONTINUED | OUTPATIENT
Start: 2024-05-01 | End: 2024-05-07

## 2024-05-01 RX ORDER — GLUCAGON INJECTION, SOLUTION 0.5 MG/.1ML
1 INJECTION, SOLUTION SUBCUTANEOUS ONCE
Refills: 0 | Status: DISCONTINUED | OUTPATIENT
Start: 2024-05-01 | End: 2024-05-07

## 2024-05-01 RX ORDER — FUROSEMIDE 40 MG
40 TABLET ORAL EVERY 12 HOURS
Refills: 0 | Status: DISCONTINUED | OUTPATIENT
Start: 2024-05-01 | End: 2024-05-02

## 2024-05-01 RX ORDER — CEFTRIAXONE 500 MG/1
2000 INJECTION, POWDER, FOR SOLUTION INTRAMUSCULAR; INTRAVENOUS EVERY 24 HOURS
Refills: 0 | Status: DISCONTINUED | OUTPATIENT
Start: 2024-05-01 | End: 2024-05-01

## 2024-05-01 RX ORDER — DEXTROSE 10 % IN WATER 10 %
125 INTRAVENOUS SOLUTION INTRAVENOUS ONCE
Refills: 0 | Status: DISCONTINUED | OUTPATIENT
Start: 2024-05-01 | End: 2024-05-07

## 2024-05-01 RX ADMIN — Medication 40 MILLIGRAM(S): at 03:32

## 2024-05-01 RX ADMIN — Medication 40 MILLIGRAM(S): at 17:25

## 2024-05-01 RX ADMIN — SODIUM CHLORIDE 100 MILLILITER(S): 9 INJECTION, SOLUTION INTRAVENOUS at 04:07

## 2024-05-01 RX ADMIN — AZITHROMYCIN 255 MILLIGRAM(S): 500 TABLET, FILM COATED ORAL at 13:11

## 2024-05-01 RX ADMIN — Medication 3 MILLILITER(S): at 15:21

## 2024-05-01 RX ADMIN — HEPARIN SODIUM 7500 UNIT(S): 5000 INJECTION INTRAVENOUS; SUBCUTANEOUS at 07:01

## 2024-05-01 RX ADMIN — HEPARIN SODIUM 7500 UNIT(S): 5000 INJECTION INTRAVENOUS; SUBCUTANEOUS at 15:43

## 2024-05-01 RX ADMIN — CEFTRIAXONE 100 MILLIGRAM(S): 500 INJECTION, POWDER, FOR SOLUTION INTRAMUSCULAR; INTRAVENOUS at 11:53

## 2024-05-01 RX ADMIN — Medication 3 MILLILITER(S): at 03:32

## 2024-05-01 RX ADMIN — HEPARIN SODIUM 7500 UNIT(S): 5000 INJECTION INTRAVENOUS; SUBCUTANEOUS at 23:58

## 2024-05-01 RX ADMIN — Medication 3 MILLILITER(S): at 09:28

## 2024-05-01 RX ADMIN — Medication 3 MILLILITER(S): at 21:22

## 2024-05-01 RX ADMIN — Medication 40 MILLIGRAM(S): at 15:20

## 2024-05-01 NOTE — H&P ADULT - ATTENDING COMMENTS
Nathaniel Barney  [facility] -> [facility], accepted during the day.  As above this is a 58 y/o male  with HTN, asthma who presented with SOB,  placed on BiPAP, he was accepted to [facility] for acute respiratory failure with PNA requiring BiPAP.  CTA showed bilateral lower lobe PNA and  renal mass.  he was also given Lasix and started on Zithromax.  -acute respiratory failure with asthma, COPD and pulmonary edema  -CAP bilateral lower lobe  -pulmonary edema, he received Lasix  >Keep SO2 above 94%, titrate FiO2, wean BiPAP  >c/w ceftriaxone and Zithromax, f/u culture an tailor the antibiotics  >DVT prophylaxis

## 2024-05-01 NOTE — PROGRESS NOTE ADULT - PROBLEM SELECTOR PLAN 3
Cr 1.59 on arrival to ED, repeat 2.06 after 12h, sp lasix & contrast in ED    - trend cr, follow up urine studies  - renally dose medications, avoid nephrotoxins Cr 1.59 on arrival to ED, repeat 2.06 after 12h, sp lasix & contrast in ED. FeNa 1.9     - trend cr, follow up urine studies  - renally dose medications, avoid nephrotoxins

## 2024-05-01 NOTE — H&P ADULT - PROBLEM SELECTOR PLAN 1
AHRF w hypercapnia, spo2 76% on presentation  complaint of several days cough, sob  imaging in ed w bilateral pna  currently on bipap support  repeat abg w ph 7.3, pco2 68, po2 145, hco3 34  RVP (-)     - continue bipap for now, wean respiratory support as tolerated  - solumedrol 40 iv bid  - duonebs q6h  - ctx & azithromycin for pna  - BCx, urine strep, legionella AHRF w hypercapnia, spo2 76% on presentation  complaint of several days cough, sob  imaging in ed w bilateral pna  leukocytosis (WBC 11.99)  currently on bipap support  repeat abg w ph 7.3, pco2 68, po2 145, hco3 34  RVP (-)     - continue bipap for now, wean respiratory support as tolerated  - solumedrol 40 iv bid  - duonebs q6h  - ctx & azithromycin for pna  - BCx, urine strep, legionella

## 2024-05-01 NOTE — PROGRESS NOTE ADULT - ATTENDING COMMENTS
Pt with hypercapnic resp failure and IVANNA contributing to acidosis. Awake and alert this AM and will try to transition to hiflo. Continue steroids and bronchodilators. Agree with ECHO. Pt has not seen a doctor in several years. He states 2 years a go he had a blood stream infection. Obtain urine lytes, anemia w/u, stool hemooccult.

## 2024-05-01 NOTE — H&P ADULT - ASSESSMENT
59M, former smoker, PMHx Asthma (denies history of intubations), HTN, HLD, presented w SOB, found to have pneumonia on CXR, hypoxia & hypercapnia on VBG, started on BiPAP support & admitted for management of Asthma exacerbation secondary to CAP.

## 2024-05-01 NOTE — PROGRESS NOTE ADULT - PROBLEM SELECTOR PLAN 4
Pt presents with hemoglobin 7.6. Unclear etiology. Iron studies showing iron deficiency.    - FOBT  - Can give iron after resolution of infection.

## 2024-05-01 NOTE — H&P ADULT - NSHPPHYSICALEXAM_GEN_ALL_CORE
General: NAD  HEENT: PERRL, EOM, anicteric sclera, MMM  Cardiovascular: +S1/S2; RRR; no M/R/G  Respiratory: diffuse bilateral wheezing  Gastrointestinal: soft, NT/ND; +BS x4  Extremities: WWP; 2+ peripheral pulses bilaterally; no LE edema  Skin: normal color and turgor; no rash  Neurologic: AOx3, no focal deficits General: NAD  HEENT: PERRL, EOM, anicteric sclera, MMM  Cardiovascular: +S1/S2; RRR; no M/R/G  Respiratory: diffuse bilateral wheezing  Gastrointestinal: soft, NT/ND; +BS x4  Extremities: WWP; 2+ peripheral pulses bilaterally; trace pitting edema around ankles  Skin: normal color and turgor; no rash  Neurologic: AOx3, no focal deficits

## 2024-05-01 NOTE — PROGRESS NOTE ADULT - PROBLEM SELECTOR PLAN 5
home medication(s): amlodipine, does not recall dose    - medication reconciliation in am, resume as-appropriate

## 2024-05-01 NOTE — H&P ADULT - PROBLEM SELECTOR PLAN 3
home medication(s): amlodipine, does not recall dose    - medication reconciliation in am, resume as-appropriate Cr 1.59 on arrival to ED, repeat 2.06 after 12h, sp lasix & contrast in ED    - trend cr, follow up urine studies  - renally dose medications, avoid nephrotoxins

## 2024-05-01 NOTE — H&P ADULT - PROBLEM SELECTOR PLAN 2
plan as above plan as above  - C/w CTX and azithromycin as above - continue CTX and azithromycin as above

## 2024-05-01 NOTE — PROGRESS NOTE ADULT - SUBJECTIVE AND OBJECTIVE BOX
OVERNIGHT EVENTS:    SUBJECTIVE / INTERVAL HPI: Patient seen and examined at bedside.     VITAL SIGNS:  Vital Signs Last 24 Hrs  T(C): 36.6 (01 May 2024 06:30), Max: 36.6 (30 Apr 2024 13:20)  T(F): 97.9 (01 May 2024 06:30), Max: 97.9 (01 May 2024 06:30)  HR: 62 (01 May 2024 08:32) (62 - 88)  BP: 126/64 (01 May 2024 08:32) (106/59 - 140/63)  BP(mean): 88 (01 May 2024 08:32) (75 - 95)  RR: 18 (01 May 2024 08:32) (16 - 27)  SpO2: 97% (01 May 2024 08:32) (76% - 100%)    Parameters below as of 01 May 2024 08:32  Patient On (Oxygen Delivery Method): BiPAP/CPAP    O2 Concentration (%): 60    PHYSICAL EXAM:    General: alert, in no acute distress  HEENT: NC/AT; PERRL, anicteric sclera; MMM  Neck: supple  Cardiovascular: +S1/S2, RRR  Respiratory: CTA B/L; no W/R/R  Gastrointestinal: soft, NT/ND; +BSx4  Extremities: WWP; no edema, clubbing or cyanosis  Vascular: 2+ radial, DP/PT pulses B/L  Neurological: AAOx3; no focal deficits    MEDICATIONS:  MEDICATIONS  (STANDING):  albuterol/ipratropium for Nebulization 3 milliLiter(s) Nebulizer every 6 hours  azithromycin  IVPB 500 milliGRAM(s) IV Intermittent every 24 hours  cefTRIAXone   IVPB 1000 milliGRAM(s) IV Intermittent every 24 hours  dextrose 10% Bolus 125 milliLiter(s) IV Bolus once  dextrose 5%. 1000 milliLiter(s) (100 mL/Hr) IV Continuous <Continuous>  dextrose 5%. 1000 milliLiter(s) (50 mL/Hr) IV Continuous <Continuous>  dextrose 50% Injectable 12.5 Gram(s) IV Push once  dextrose 50% Injectable 25 Gram(s) IV Push once  glucagon  Injectable 1 milliGRAM(s) IntraMuscular once  heparin   Injectable 7500 Unit(s) SubCutaneous every 8 hours  influenza   Vaccine 0.5 milliLiter(s) IntraMuscular once  insulin lispro (ADMELOG) corrective regimen sliding scale   SubCutaneous every 6 hours  lactated ringers. 500 milliLiter(s) (100 mL/Hr) IV Continuous <Continuous>  methylPREDNISolone sodium succinate Injectable 40 milliGRAM(s) IV Push every 12 hours    MEDICATIONS  (PRN):  dextrose Oral Gel 15 Gram(s) Oral once PRN Blood Glucose LESS THAN 70 milliGRAM(s)/deciliter      ALLERGIES:  Allergies    No Known Allergies    Intolerances        LABS:                        7.6    14.50 )-----------( 233      ( 01 May 2024 05:30 )             32.1     05-01    140  |  98  |  22  ----------------------------<  125<H>  4.1   |  30  |  2.06<H>    Ca    8.8      01 May 2024 05:30  Phos  6.8     05-01  Mg     2.4     05-01    TPro  7.3  /  Alb  3.8  /  TBili  0.3  /  DBili  x   /  AST  15  /  ALT  7<L>  /  AlkPhos  82  05-01    PT/INR - ( 30 Apr 2024 13:23 )   PT: 12.7 sec;   INR: 1.16          PTT - ( 30 Apr 2024 13:23 )  PTT:28.2 sec  Urinalysis Basic - ( 01 May 2024 05:30 )    Color: x / Appearance: x / SG: x / pH: x  Gluc: 125 mg/dL / Ketone: x  / Bili: x / Urobili: x   Blood: x / Protein: x / Nitrite: x   Leuk Esterase: x / RBC: x / WBC x   Sq Epi: x / Non Sq Epi: x / Bacteria: x      CAPILLARY BLOOD GLUCOSE      POCT Blood Glucose.: 143 mg/dL (01 May 2024 05:36)      RADIOLOGY & ADDITIONAL TESTS: Reviewed. OVERNIGHT EVENTS: : repeat ABG upon arrival 7.30/68/145/34. chronic respiratory acidosis. worsening kidney fxn, BS x 1->136cc. will give some light IVF, s/p CTA and lasix prior. LR 100cc/5 hours.    SUBJECTIVE / INTERVAL HPI: Patient seen and examined at bedside.     VITAL SIGNS:  Vital Signs Last 24 Hrs  T(C): 36.6 (01 May 2024 06:30), Max: 36.6 (30 Apr 2024 13:20)  T(F): 97.9 (01 May 2024 06:30), Max: 97.9 (01 May 2024 06:30)  HR: 62 (01 May 2024 08:32) (62 - 88)  BP: 126/64 (01 May 2024 08:32) (106/59 - 140/63)  BP(mean): 88 (01 May 2024 08:32) (75 - 95)  RR: 18 (01 May 2024 08:32) (16 - 27)  SpO2: 97% (01 May 2024 08:32) (76% - 100%)    Parameters below as of 01 May 2024 08:32  Patient On (Oxygen Delivery Method): BiPAP/CPAP    O2 Concentration (%): 60    PHYSICAL EXAM:    General: obese patient, alert, in no acute distress, on HFNC 50/50  HEENT: NC/AT; PERRL, anicteric sclera; MMM  Neck: supple  Cardiovascular: +S1/S2, RRR  Respiratory: expiratory wheezes and crackles all over   Gastrointestinal: soft, NT/distended ; +BSx4  Extremities: WWP; no edema, clubbing or cyanosis  Vascular: 2+ radial, DP/PT pulses B/L  Neurological: AAOx3; no focal deficits    MEDICATIONS:  MEDICATIONS  (STANDING):  albuterol/ipratropium for Nebulization 3 milliLiter(s) Nebulizer every 6 hours  azithromycin  IVPB 500 milliGRAM(s) IV Intermittent every 24 hours  cefTRIAXone   IVPB 1000 milliGRAM(s) IV Intermittent every 24 hours  dextrose 10% Bolus 125 milliLiter(s) IV Bolus once  dextrose 5%. 1000 milliLiter(s) (100 mL/Hr) IV Continuous <Continuous>  dextrose 5%. 1000 milliLiter(s) (50 mL/Hr) IV Continuous <Continuous>  dextrose 50% Injectable 12.5 Gram(s) IV Push once  dextrose 50% Injectable 25 Gram(s) IV Push once  glucagon  Injectable 1 milliGRAM(s) IntraMuscular once  heparin   Injectable 7500 Unit(s) SubCutaneous every 8 hours  influenza   Vaccine 0.5 milliLiter(s) IntraMuscular once  insulin lispro (ADMELOG) corrective regimen sliding scale   SubCutaneous every 6 hours  lactated ringers. 500 milliLiter(s) (100 mL/Hr) IV Continuous <Continuous>  methylPREDNISolone sodium succinate Injectable 40 milliGRAM(s) IV Push every 12 hours    MEDICATIONS  (PRN):  dextrose Oral Gel 15 Gram(s) Oral once PRN Blood Glucose LESS THAN 70 milliGRAM(s)/deciliter      ALLERGIES:  Allergies    No Known Allergies    Intolerances        LABS:                        7.6    14.50 )-----------( 233      ( 01 May 2024 05:30 )             32.1     05-01    140  |  98  |  22  ----------------------------<  125<H>  4.1   |  30  |  2.06<H>    Ca    8.8      01 May 2024 05:30  Phos  6.8     05-01  Mg     2.4     05-01    TPro  7.3  /  Alb  3.8  /  TBili  0.3  /  DBili  x   /  AST  15  /  ALT  7<L>  /  AlkPhos  82  05-01    PT/INR - ( 30 Apr 2024 13:23 )   PT: 12.7 sec;   INR: 1.16          PTT - ( 30 Apr 2024 13:23 )  PTT:28.2 sec  Urinalysis Basic - ( 01 May 2024 05:30 )    Color: x / Appearance: x / SG: x / pH: x  Gluc: 125 mg/dL / Ketone: x  / Bili: x / Urobili: x   Blood: x / Protein: x / Nitrite: x   Leuk Esterase: x / RBC: x / WBC x   Sq Epi: x / Non Sq Epi: x / Bacteria: x      CAPILLARY BLOOD GLUCOSE      POCT Blood Glucose.: 143 mg/dL (01 May 2024 05:36)      RADIOLOGY & ADDITIONAL TESTS: Reviewed.

## 2024-05-01 NOTE — H&P ADULT - PROBLEM SELECTOR PLAN 6
F: none  E: replete as needed  N: npo for now  VTE ppx: heparin sq  GI ppx: none  [facility]  Code status: FULL

## 2024-05-01 NOTE — H&P ADULT - PROBLEM SELECTOR PLAN 4
home medication(s): lipitor, does not recall dose    - medication reconciliation in am, resume as-appropriate home medication(s): amlodipine, does not recall dose    - medication reconciliation in am, resume as-appropriate

## 2024-05-01 NOTE — PATIENT PROFILE ADULT - FALL HARM RISK - HARM RISK INTERVENTIONS

## 2024-05-01 NOTE — H&P ADULT - HISTORY OF PRESENT ILLNESS
59M, former smoker, PMHx COPD (denies history of intubations) who presented [facility] w complaint of new-onset SOB that did not improve w 3 rounds of albuterol, transferred to [facility] for management of COPD exacerbation. Pt reports today's dsymptoms were prewceded by a few days of worsening, productive cough, denies associated fevers, chills, chest pain, nausea, vomiting, and diarrhea.****    · Clinical Summary  (MDM): Summarize the clinical encounter	Patient here with history of asthma and long-term smoking history now coming in for diffuse wheezing and worsening asthma symptoms over the last several days requiring nebulizers, steroids, magnesium in the field.  Patient requiring multiple rounds of nebulizers here in the emergency room.  Patient becoming more somnolent later in the shift with hypercapnia on repeat VBG.  Requiring BiPAP support.  Endorsed to MICU team advised on BiPAP settings advised reassess with adjusted BiPAP settings.  eICU involved as well for optimization of BiPAP settings.  Patient pending VBG   59M, former smoker, PMHx Asthma (denies history of intubations), HTN, and HLD, who presented to [facility] w complaint of SOB in the setting of upper respiratory infection, found to have pneumonia on CXR, hypoxia & hypercapnia on VBG, started on BiPAP support & transferred to [facility] for management of Asthma exacerbation secondary to CAP. Pt reports today's symptoms were preceded by a few days of worsening, productive cough, denies associated fevers, chills, chest pain, nausea, vomiting, and diarrhea.    ED Course -  Presenting vitals:   Notable labs:  Imaging:  Interventions: azithromycin 500mg x1, ctx 1g iv x1, furosemide 50mg iv x1, nebulized albuterol x3  Consults:  59M, former smoker, PMHx Asthma (denies history of intubations), HTN, and HLD, who presented to [facility] w complaint of SOB in the setting of upper respiratory infection, found to have pneumonia on CXR, hypoxia & hypercapnia on VBG, started on BiPAP support & transferred to [facility] for management of Asthma exacerbation secondary to CAP. Pt reports today's symptoms were preceded by a few days of worsening, productive cough, denies associated fevers, chills, chest pain, nausea, vomiting, and diarrhea.    ED Course -    Presenting vitals: 97.8, HR 78, 114/61, spo2 76% on RA, RR 27    Notable labs: WBC 11.99, Hgb 7.7, Hct 31.2, MCV 65, K+ 3.4, CO2 33, AG 5, BUN/Cr 17/1.59    Imaging - CXR: cardiomegaly with cephalization of pulmonary vessels   consistent with pulmonary venous congestion. More confluent opacity over   the right lower lung zone as described above.  CTA: Suboptimal exam; no pulmonary embolism to the proximal segmental level.  Bilateral lower lobe pneumonia in the setting of bronchial impaction.  Indeterminate 4.3 cm right renal lesion, which can be further evaluated with CT renal mass protocol or MRI.     Interventions: azithromycin 500mg x1, ctx 1g iv x1, furosemide 50mg iv x1, nebulized albuterol x3

## 2024-05-01 NOTE — PROGRESS NOTE ADULT - PROBLEM SELECTOR PLAN 7
F: none  E: replete as needed  N: npo for now  VTE ppx: heparin sq  GI ppx: none  [facility]  Code status: FULL

## 2024-05-01 NOTE — PROGRESS NOTE ADULT - PROBLEM SELECTOR PLAN 1
AHRF w hypercapnia, spo2 76% on presentation  complaint of several days cough, sob  imaging in ed w bilateral pna  leukocytosis (WBC 11.99)  currently on bipap support  repeat abg w ph 7.3, pco2 68, po2 145, hco3 34  RVP (-)     - continue HFNC 50/50 for now, wean respiratory support as tolerated  - TTE ordered  - Repeat ABG  - Repeat BMP   - solumedrol 40 iv bid  - duonebs q6h  - ctx & azithromycin for pna  - BCx, urine strep, legionella

## 2024-05-01 NOTE — PROGRESS NOTE ADULT - PROBLEM SELECTOR PLAN 6
home medication(s): lipitor, does not recall dose    - medication reconciliation in am, resume as-appropriate

## 2024-05-01 NOTE — H&P ADULT - PROBLEM SELECTOR PLAN 5
F: none  E: replete as needed  N: npo for now  VTE ppx: heparin sq  GI ppx: none  [facility]  Code status: FULL home medication(s): lipitor, does not recall dose    - medication reconciliation in am, resume as-appropriate

## 2024-05-02 DIAGNOSIS — I27.20 PULMONARY HYPERTENSION, UNSPECIFIED: ICD-10-CM

## 2024-05-02 LAB
A1C WITH ESTIMATED AVERAGE GLUCOSE RESULT: 5.4 % — SIGNIFICANT CHANGE UP (ref 4–5.6)
ALBUMIN SERPL ELPH-MCNC: 3.8 G/DL — SIGNIFICANT CHANGE UP (ref 3.3–5)
ALP SERPL-CCNC: 81 U/L — SIGNIFICANT CHANGE UP (ref 40–120)
ALT FLD-CCNC: 7 U/L — LOW (ref 10–45)
ANION GAP SERPL CALC-SCNC: 10 MMOL/L — SIGNIFICANT CHANGE UP (ref 5–17)
AST SERPL-CCNC: 11 U/L — SIGNIFICANT CHANGE UP (ref 10–40)
BASOPHILS # BLD AUTO: 0.01 K/UL — SIGNIFICANT CHANGE UP (ref 0–0.2)
BASOPHILS NFR BLD AUTO: 0.1 % — SIGNIFICANT CHANGE UP (ref 0–2)
BILIRUB SERPL-MCNC: 0.3 MG/DL — SIGNIFICANT CHANGE UP (ref 0.2–1.2)
BUN SERPL-MCNC: 37 MG/DL — HIGH (ref 7–23)
CALCIUM SERPL-MCNC: 9.1 MG/DL — SIGNIFICANT CHANGE UP (ref 8.4–10.5)
CHLORIDE SERPL-SCNC: 100 MMOL/L — SIGNIFICANT CHANGE UP (ref 96–108)
CO2 SERPL-SCNC: 31 MMOL/L — SIGNIFICANT CHANGE UP (ref 22–31)
CREAT SERPL-MCNC: 2.22 MG/DL — HIGH (ref 0.5–1.3)
CRP SERPL-MCNC: 8.8 MG/L — HIGH (ref 0–4)
EGFR: 33 ML/MIN/1.73M2 — LOW
EOSINOPHIL # BLD AUTO: 0 K/UL — SIGNIFICANT CHANGE UP (ref 0–0.5)
EOSINOPHIL NFR BLD AUTO: 0 % — SIGNIFICANT CHANGE UP (ref 0–6)
ESTIMATED AVERAGE GLUCOSE: 108 MG/DL — SIGNIFICANT CHANGE UP (ref 68–114)
GLUCOSE BLDC GLUCOMTR-MCNC: 140 MG/DL — HIGH (ref 70–99)
GLUCOSE BLDC GLUCOMTR-MCNC: 166 MG/DL — HIGH (ref 70–99)
GLUCOSE BLDC GLUCOMTR-MCNC: 171 MG/DL — HIGH (ref 70–99)
GLUCOSE BLDC GLUCOMTR-MCNC: 187 MG/DL — HIGH (ref 70–99)
GLUCOSE SERPL-MCNC: 123 MG/DL — HIGH (ref 70–99)
HCT VFR BLD CALC: 33 % — LOW (ref 39–50)
HGB BLD-MCNC: 7.7 G/DL — LOW (ref 13–17)
IMM GRANULOCYTES NFR BLD AUTO: 1 % — HIGH (ref 0–0.9)
LEGIONELLA AG UR QL: NEGATIVE — SIGNIFICANT CHANGE UP
LYMPHOCYTES # BLD AUTO: 0.55 K/UL — LOW (ref 1–3.3)
LYMPHOCYTES # BLD AUTO: 3.9 % — LOW (ref 13–44)
MAGNESIUM SERPL-MCNC: 2.6 MG/DL — SIGNIFICANT CHANGE UP (ref 1.6–2.6)
MCHC RBC-ENTMCNC: 15.7 PG — LOW (ref 27–34)
MCHC RBC-ENTMCNC: 23.3 GM/DL — LOW (ref 32–36)
MCV RBC AUTO: 67.5 FL — LOW (ref 80–100)
MONOCYTES # BLD AUTO: 0.35 K/UL — SIGNIFICANT CHANGE UP (ref 0–0.9)
MONOCYTES NFR BLD AUTO: 2.5 % — SIGNIFICANT CHANGE UP (ref 2–14)
NEUTROPHILS # BLD AUTO: 12.89 K/UL — HIGH (ref 1.8–7.4)
NEUTROPHILS NFR BLD AUTO: 92.5 % — HIGH (ref 43–77)
NRBC # BLD: 0 /100 WBCS — SIGNIFICANT CHANGE UP (ref 0–0)
PHOSPHATE SERPL-MCNC: 5.3 MG/DL — HIGH (ref 2.5–4.5)
PLATELET # BLD AUTO: 253 K/UL — SIGNIFICANT CHANGE UP (ref 150–400)
POTASSIUM SERPL-MCNC: 4.8 MMOL/L — SIGNIFICANT CHANGE UP (ref 3.5–5.3)
POTASSIUM SERPL-SCNC: 4.8 MMOL/L — SIGNIFICANT CHANGE UP (ref 3.5–5.3)
PROT SERPL-MCNC: 7.5 G/DL — SIGNIFICANT CHANGE UP (ref 6–8.3)
RBC # BLD: 4.89 M/UL — SIGNIFICANT CHANGE UP (ref 4.2–5.8)
RBC # FLD: 25.2 % — HIGH (ref 10.3–14.5)
SODIUM SERPL-SCNC: 141 MMOL/L — SIGNIFICANT CHANGE UP (ref 135–145)
WBC # BLD: 13.94 K/UL — HIGH (ref 3.8–10.5)
WBC # FLD AUTO: 13.94 K/UL — HIGH (ref 3.8–10.5)

## 2024-05-02 PROCEDURE — 99223 1ST HOSP IP/OBS HIGH 75: CPT

## 2024-05-02 PROCEDURE — 99221 1ST HOSP IP/OBS SF/LOW 40: CPT

## 2024-05-02 PROCEDURE — 99233 SBSQ HOSP IP/OBS HIGH 50: CPT | Mod: GC

## 2024-05-02 RX ORDER — FUROSEMIDE 40 MG
60 TABLET ORAL EVERY 12 HOURS
Refills: 0 | Status: DISCONTINUED | OUTPATIENT
Start: 2024-05-02 | End: 2024-05-02

## 2024-05-02 RX ADMIN — HEPARIN SODIUM 7500 UNIT(S): 5000 INJECTION INTRAVENOUS; SUBCUTANEOUS at 16:07

## 2024-05-02 RX ADMIN — Medication 1: at 16:52

## 2024-05-02 RX ADMIN — HEPARIN SODIUM 7500 UNIT(S): 5000 INJECTION INTRAVENOUS; SUBCUTANEOUS at 07:01

## 2024-05-02 RX ADMIN — Medication 60 MILLIGRAM(S): at 11:36

## 2024-05-02 RX ADMIN — Medication 40 MILLIGRAM(S): at 03:13

## 2024-05-02 RX ADMIN — Medication 1: at 21:39

## 2024-05-02 RX ADMIN — Medication 3 MILLILITER(S): at 09:37

## 2024-05-02 RX ADMIN — Medication 3 MILLILITER(S): at 21:39

## 2024-05-02 RX ADMIN — Medication 3 MILLILITER(S): at 03:13

## 2024-05-02 RX ADMIN — CEFTRIAXONE 100 MILLIGRAM(S): 500 INJECTION, POWDER, FOR SOLUTION INTRAMUSCULAR; INTRAVENOUS at 11:36

## 2024-05-02 RX ADMIN — Medication 40 MILLIGRAM(S): at 05:22

## 2024-05-02 RX ADMIN — Medication 3 MILLILITER(S): at 16:07

## 2024-05-02 NOTE — PROGRESS NOTE ADULT - PROBLEM SELECTOR PLAN 1
AHRF w hypercapnia, spo2 76% on presentation  complaint of several days cough, sob  imaging in ed w bilateral pna  leukocytosis (WBC 11.99)  currently on bipap support  repeat abg w ph 7.3, pco2 68, po2 145, hco3 34  RVP (-)     - continue HFNC 50/50 for now, wean respiratory support as tolerated  - TTE ordered  - Repeat ABG  - Repeat BMP   - solumedrol 40 iv bid  - duonebs q6h  - ctx & azithromycin for pna  - BCx, urine strep, legionella AHRF w hypercapnia, spo2 76% on presentation  complaint of several days cough, sob  imaging in ed w bilateral pna  leukocytosis (WBC 11.99)  currently on bipap support  repeat abg w ph 7.3, pco2 68, po2 145, hco3 34  RVP (-)   Legionella (-)  TTE wnl  - HFNC weaned to NC  - solumedrol 40 iv bid  - duonebs q6h  - dc'd azithro  - c/w CTX  - BCx NGTD

## 2024-05-02 NOTE — PROGRESS NOTE ADULT - PROBLEM SELECTOR PLAN 4
Pt presents with hemoglobin 7.6. Unclear etiology. Iron studies showing iron deficiency.    - FOBT  - Can give iron after resolution of infection. Cr 1.59 on arrival to ED, repeat 2.06 after 12h, sp lasix & contrast in ED. FeNa 1.9. Crt uptrending.   - Renal consulted, appreciate recs  - F/u renal US/bladder scan to r/o post-renal causes and urinary retention  - f/u cystatin c  - trend cr, follow up urine studies  - renally dose medications, avoid nephrotoxins

## 2024-05-02 NOTE — PHYSICAL THERAPY INITIAL EVALUATION ADULT - GAIT DEVIATIONS NOTED, PT EVAL
decreased caty/increased time in double stance/decreased velocity of limb motion/decreased step length/decreased stride length/decreased weight-shifting ability

## 2024-05-02 NOTE — PHYSICAL THERAPY INITIAL EVALUATION ADULT - GENERAL OBSERVATIONS, REHAB EVAL
PT IE completed. Patient received semi supine in bed +tele, +HFNC (50 L; FiO2 50%), +IV, NAD, willing to work with PT.

## 2024-05-02 NOTE — CONSULT NOTE ADULT - ATTENDING COMMENTS
58 yo M active smoker, extensive smoking history (> 40pack years), asthma vs. COPD, apparently had no exercise limitation, no respiratory symptoms until 3-4 days ago when he developed cough and then significant SOB to the point of dyspnea on minimal exertion, found to have acute hypercapnic and hypoxic respiratory failure, IVANNA, eosinophilia, and pna (infiltrate seen on CT, CT also with mild mosaicism suggesting airway disease). On today's evaluation he feels much better from admission, though has not attempted to get OOBTC or walk yet. Feels his breathing is easier. On exam he is on HFNC, SpO2 >94%, normotensive, diffuse wheezing and rhonchi noted. Able to expectorate thick yellow/grey sputum. He has trace B/L pitting edema. WWP. Labs with persistent IVANNA vs CKD, eosinophilia has normalized after steroids, hypercapnia improving. CT scan as noted above with mild mosaicism, enlarged PA, and B/L consolidations consistent w/ pna. TTE reviewed personally, PASP slightly overestimated, more 65 and with mild septal flattening in systole consistent with pressure rather than volume overload. Despite elevated PASP, RV is robust with normal size and function, possible mild RVH with increased trabeculations.    #PH - Likely multifactorial, high suspicion for group III disease (obesity, some signs/sx of BLANCA, and known smoking hx with likely significant airway disease), underlying hypoxia and hypercapnia will worsen PH as well as acute infection. Diuresis typically very helpful; however, he is also noted to have RUCHI and TTE not consistent with significant volume overload.     Prefer not to evaluate hemodynamics in acute setting with infection/airway inflammation/exacerbation. Should have continued treatment of CAP and asthma (suspect some element of COPD as well) and work up for eosinophilia. Clinically he continues to improve and ideally would have PH follow up after resolution of acute event. Unfortunately he cannot commute to PH clinic in [place] [facility], but needs referral to pulmonologist by his PCP. Please have records faxed to PCP to ensure continued care. Agree with steroids, abx, and NIPPV at night as well as PRN during the day. Significant sputum production, please send for sputum culture. Would pull back on diuresis at this time.     If clinical improvement plateaus or patient worsens may consider further in patient work up with [facility], would not undergo until at least next week to give acute process time to resolve.     Reviewed medications, laboratory results, imaging. Decision making of high complexity.

## 2024-05-02 NOTE — CONSULT NOTE ADULT - ASSESSMENT
# Non oliguric IVANNA with unknown renal function:   Patient presented with Cre 1.5, peak 2.2 with unknown baseline. IVANNA appears to be multifocal in the setting of contrast use, diuresis and home NSAID use. Less likely post renal, however will need imagine to rule out.  a 4.3 cm right renal lesion seen on partial kidney imagine which is unlikely the cause of his IVANNA.     Plan:   Would hold off on diuresis at the time   Monitor I/Os   Please Obtain renal US / balder scans to r/o post renal causes and urinary retention   Please obtain cystatin C   Renally dose medications to GFR 30      # Microcytic anemia:   Likely mixed LEI and anemia of chronic disease.   Iron panel with Sats of 5, total Iron 17, ferritin 5 and transferrin of 323   Given current state of infection can hold off on iron supplement. However, patient would benefit from supplementation     # Respiratory acidosis based on abg 5/1  Likely in setting of Asthma/COPD exacerbation and hypoxemia   S/p nightly bipap   Please obtain ABG or VBG  59y Male W/ PMHx of COPD, HTN and knee pain admitted for Asthma/ COPD exacerbation and bilateral lower lobe pneumonia. Nephrology was consulted for IVANNA with Cre of 2.2 ( 1.5 on admission) with no baseline Cre on file.     # Non oliguric IVANNA with unknown baseline renal function:   Patient presented with Cre 1.5, peak 2.2 with unknown baseline. IVANNA appears to be multifocal in the setting of contrast use, diuresis and home NSAID use. Less likely post renal, however will need imagine to rule out.  a 4.3 cm right renal lesion seen on partial kidney imagine which is unlikely the cause of his IVANNA.     Plan:   Would hold off on diuresis at the time   Monitor I/Os   Please Obtain renal US / balder scans to r/o post renal causes and urinary retention   Please obtain cystatin C   Renally dose medications to GFR 30      # Microcytic anemia:   Likely mixed LEI and anemia of chronic disease.   Iron panel with Sats of 5, total Iron 17, ferritin 5 and transferrin of 323   Given current state of infection can hold off on iron supplement. However, patient would benefit from supplementation     # Respiratory acidosis based on abg 5/1  Likely in setting of Asthma/COPD exacerbation and hypoxemia   S/p nightly bipap   Please obtain ABG or VBG  59y Male W/ PMHx of COPD, HTN and knee pain admitted for Asthma/ COPD exacerbation and bilateral lower lobe pneumonia. Nephrology was consulted for IVANNA with Cre of 2.2 ( 1.5 on admission) with no baseline Cre on file.     # Non oliguric IVANNA with unknown baseline renal function:   Patient presented with Cre 1.5, peak 2.2 with unknown baseline. IVANNA appears to be multifactorial in the setting of recent contrast use, diuresis and home NSAID use. Less likely post renal, however will need imagine to rule out.  a 4.3 cm right renal lesion seen on partial kidney imagine which is unlikely the cause of his IVANNA.     Plan:   Would hold off on diuresis at the time   Monitor I/Os   Please Obtain renal US / bladder scans to r/o post renal causes and urinary retention   Please obtain cystatin C levels  Check UPC, UAC and urine electrolytes   Renally dose medications to GFR 30      # Microcytic anemia:   Likely mixed LEI and anemia of chronic disease.   Iron panel with Sats of 5, total Iron 17, ferritin 5 and transferrin of 323   Given current state of infection can hold off on iron supplement. However, patient would benefit from supplementation     # Respiratory acidosis based on abg 5/1  Likely in setting of Asthma/COPD exacerbation and hypoxemia   S/p nightly bipap   Please obtain ABG or VBG     Discussed with primary team

## 2024-05-02 NOTE — CONSULT NOTE ADULT - SUBJECTIVE AND OBJECTIVE BOX
NEPHROLOGY CONSULTATION NOTE    59y Male W/ PMHx of COPD, HTN and knee pain who presented with worsening shortness of breath on ambulation admitted for Asthma/ COPD exacerbation and bilateral lower lobe pneumonia. Nephrology was consulted for IVANNA with Cre of 2.2 ( 1.5 on admission).   Patient reports seeing a physical over a year ago with no reported renal diseases. Since Sunday ( 4 days ago) he has been experiencing URI symptoms, followed by shortness of breath on ambulation 2 days after which prompted him to call 911. Patient underwent CTA with contrast on admission to r/o PE with no evidence of PE, but positive for bilateral lower lobe pneumonia and 4.3cm right renal lesion.  ABG showed acute hypoxic and hypercapnic respiratory failure requiring bipap at nights. Patient was also started on lasix to optimize respiratory status. Of note, patient suffers from chronic knee pain and has been taking 1-2 alleves every morning for the past month.   Since, his Cre has been uptrending, prompting nephrology consult. Patient seen and examined with daughter present at bedside, on HFNC 50L/50FiO2. Rerpots urinating frequency, States still feels nasal congestion but his overall respiratory statues has been improved. Denies any fevers/chills, N/V/D, orthopnea, PND or edema.       PAST MEDICAL & SURGICAL HISTORY:  Asthma  COPD  HTN    Allergies:  No Known Allergies    Home Medications Reviewed  Hospital Medications:   MEDICATIONS  (STANDING):  albuterol/ipratropium for Nebulization 3 milliLiter(s) Nebulizer every 6 hours  cefTRIAXone   IVPB 1000 milliGRAM(s) IV Intermittent every 24 hours  dextrose 10% Bolus 125 milliLiter(s) IV Bolus once  dextrose 5%. 1000 milliLiter(s) (50 mL/Hr) IV Continuous <Continuous>  dextrose 5%. 1000 milliLiter(s) (100 mL/Hr) IV Continuous <Continuous>  dextrose 50% Injectable 25 Gram(s) IV Push once  dextrose 50% Injectable 12.5 Gram(s) IV Push once  furosemide   Injectable 60 milliGRAM(s) IV Push every 12 hours  glucagon  Injectable 1 milliGRAM(s) IntraMuscular once  heparin   Injectable 7500 Unit(s) SubCutaneous every 8 hours  influenza   Vaccine 0.5 milliLiter(s) IntraMuscular once  insulin lispro (ADMELOG) corrective regimen sliding scale   SubCutaneous Before meals and at bedtime    SOCIAL HISTORY:  Active smoker   FAMILY HISTORY:  Noncontributory        VITALS:  Vital Signs Last 24 Hrs  T(C): 36.9 (02 May 2024 10:00), Max: 36.9 (01 May 2024 15:06)  T(F): 98.5 (02 May 2024 10:00), Max: 98.5 (02 May 2024 00:30)  HR: 72 (02 May 2024 11:40) (62 - 78)  BP: 130/64 (02 May 2024 11:40) (117/59 - 152/80)  BP(mean): 91 (02 May 2024 11:40) (83 - 110)  RR: 18 (02 May 2024 11:40) (16 - 24)  SpO2: 94% (02 May 2024 11:40) (94% - 99%)    Parameters below as of 02 May 2024 11:40  Patient On (Oxygen Delivery Method): nasal cannula, high flow  O2 Flow (L/min): 50  O2 Concentration (%): 50    05-01 @ 07:01  -  05-02 @ 07:00  --------------------------------------------------------  IN: 0 mL / OUT: 1100 mL / NET: -1100 mL    05-02 @ 07:01  -  05-02 @ 14:27  --------------------------------------------------------  IN: 0 mL / OUT: 450 mL / NET: -450 mL          PHYSICAL EXAM:  Constitutional: NAD, obese, on HFNC, nasal congestion   Neck: No JVD, soft   Respiratory: bilateral wheezes and rhonchi,No rales   Cardiovascular: S1, S2, no murmurs  Gastrointestinal: distended, no guarding or rigidity   Extremities: No peripheral edema, pulses present       LABS:  05-02    141  |  100  |  37<H>  ----------------------------<  123<H>  4.8   |  31  |  2.22<H>    Ca    9.1      02 May 2024 05:30  Phos  5.3     05-02  Mg     2.6     05-02    TPro  7.5  /  Alb  3.8  /  TBili  0.3  /  DBili      /  AST  11  /  ALT  7<L>  /  AlkPhos  81  05-02    Creatinine Trend: 2.22 <--, 2.09 <--, 2.06 <--, 2.06 <--, 1.59 <--                        7.7    13.94 )-----------( 253      ( 02 May 2024 05:30 )             33.0     Urine Studies:  Urinalysis Basic - ( 02 May 2024 05:30 )    Color:  / Appearance:  / SG:  / pH:   Gluc: 123 mg/dL / Ketone:   / Bili:  / Urobili:    Blood:  / Protein:  / Nitrite:    Leuk Esterase:  / RBC:  / WBC    Sq Epi:  / Non Sq Epi:  / Bacteria:       Sodium, Random Urine: 20 mmol/L (05-01 @ 07:16)  Creatinine, Random Urine: 16 mg/dL (05-01 @ 07:16)  Potassium, Random Urine: 3 mmol/L (05-01 @ 07:16)            RADIOLOGY & ADDITIONAL STUDIES:

## 2024-05-02 NOTE — PROGRESS NOTE ADULT - PROBLEM SELECTOR PLAN 5
home medication(s): amlodipine, does not recall dose    - medication reconciliation in am, resume as-appropriate Pt presents with hemoglobin 7.6. Unclear etiology. Iron studies showing iron deficiency.  - f/u FOBT  - Can give iron after resolution of infection

## 2024-05-02 NOTE — PROGRESS NOTE ADULT - PROBLEM SELECTOR PLAN 3
Cr 1.59 on arrival to ED, repeat 2.06 after 12h, sp lasix & contrast in ED. FeNa 1.9     - trend cr, follow up urine studies  - renally dose medications, avoid nephrotoxins S/p TTE with pulmonary hypertension present, pulmonary artery systolic pressure is 85 mmHg. S/p lasix 40 bid. Pulm consulted.   - Appreciate pulm recs - believe 2/2 pressure not volume  - Work up eosinophilia, connective tissue dx/autoimmune/vasculitis (MALENA, ANCA, anti-CCP, RF, IgE level, anti-SSA, anti-Scl70, anti-myomarker panel, CRP)  - Work up infectious causes (aspergillosis IgG, serum GM, strongyloides ab, stool for O&P, peripheral blood smear for parasites)

## 2024-05-02 NOTE — PHYSICAL THERAPY INITIAL EVALUATION ADULT - ADDITIONAL COMMENTS
Community ambulator (works for [employer]) who lives with his adult son and adult daughter in apartment + "a few" KARON. Independent with all ADLs prior and denies use of AD when ambulating.

## 2024-05-02 NOTE — CONSULT NOTE ADULT - ATTENDING COMMENTS
59y M with hx of COPD and HTN admitted for LLL pneumonia. Hospital course c/b non oliguric IVANNA w/ SCr on admission is 1.59 now at 2.2. Records indicate patient had CTA on 4/30, +/- residual effects of previous NSAID use. Suspect IVANNA non oliguric likely related to these insults.   Please request urine lytes and renal US   Obtain cystatin C levels.   Maintain MAP > 65, avoid volume depletion   Avoid NSAIDs   Maintain strict I/o monitoring  Trend BMP for additional monitoring    Further recs as above  Discussed with primary team 59y M with hx of COPD and HTN admitted for LLL pneumonia. Hospital course c/b non oliguric IVANNA w/ SCr on admission is 1.59 now at 2.2. Records indicate patient had CTA on 4/30, +/- residual effects of previous NSAID use. Suspect IVANNA non oliguric likely related to these insults.   Please request urine lytes and renal US   Obtain cystatin C levels.   Maintain MAP > 65, avoid volume depletion   Avoid NSAIDs   Maintain strict I/o monitoring  Trend BMP for additional monitoring    Further recs as above  Discussed with primary team.

## 2024-05-02 NOTE — PROGRESS NOTE ADULT - SUBJECTIVE AND OBJECTIVE BOX
OVERNIGHT EVENTS:    SUBJECTIVE / INTERVAL HPI: Patient seen and examined at bedside.     VITAL SIGNS:  Vital Signs Last 24 Hrs  T(C): 36.7 (02 May 2024 06:27), Max: 36.9 (01 May 2024 15:06)  T(F): 98 (02 May 2024 06:27), Max: 98.5 (02 May 2024 00:30)  HR: 62 (02 May 2024 05:21) (62 - 78)  BP: 133/61 (02 May 2024 05:21) (117/59 - 152/80)  BP(mean): 88 (02 May 2024 05:21) (83 - 110)  RR: 18 (02 May 2024 05:21) (16 - 24)  SpO2: 97% (02 May 2024 05:21) (94% - 98%)    Parameters below as of 02 May 2024 05:21  Patient On (Oxygen Delivery Method): BiPAP/CPAP        PHYSICAL EXAM:    General: NAD  HEENT: NC/AT; PERRL, anicteric sclera; MMM  Neck: supple w/o palpable nodularity  Cardiovascular: +S1/S2; RRR  Respiratory: CTA B/L; no W/R/R  Gastrointestinal: soft, NT/ND; +BSx4  Extremities: WWP; no edema, clubbing or cyanosis  Vascular: 2+ radial, DP/PT pulses B/L  Neurological: AAOx3; no focal deficits    MEDICATIONS:  MEDICATIONS  (STANDING):  albuterol/ipratropium for Nebulization 3 milliLiter(s) Nebulizer every 6 hours  azithromycin  IVPB 500 milliGRAM(s) IV Intermittent every 24 hours  cefTRIAXone   IVPB 1000 milliGRAM(s) IV Intermittent every 24 hours  dextrose 10% Bolus 125 milliLiter(s) IV Bolus once  dextrose 5%. 1000 milliLiter(s) (50 mL/Hr) IV Continuous <Continuous>  dextrose 5%. 1000 milliLiter(s) (100 mL/Hr) IV Continuous <Continuous>  dextrose 50% Injectable 25 Gram(s) IV Push once  dextrose 50% Injectable 12.5 Gram(s) IV Push once  furosemide   Injectable 40 milliGRAM(s) IV Push every 12 hours  glucagon  Injectable 1 milliGRAM(s) IntraMuscular once  heparin   Injectable 7500 Unit(s) SubCutaneous every 8 hours  influenza   Vaccine 0.5 milliLiter(s) IntraMuscular once  insulin lispro (ADMELOG) corrective regimen sliding scale   SubCutaneous every 6 hours  lactated ringers. 500 milliLiter(s) (100 mL/Hr) IV Continuous <Continuous>  methylPREDNISolone sodium succinate Injectable 40 milliGRAM(s) IV Push every 12 hours    MEDICATIONS  (PRN):  dextrose Oral Gel 15 Gram(s) Oral once PRN Blood Glucose LESS THAN 70 milliGRAM(s)/deciliter      ALLERGIES:  Allergies    No Known Allergies    Intolerances        LABS:                        7.6    14.50 )-----------( 233      ( 01 May 2024 05:30 )             32.1     05-01    142  |  100  |  24<H>  ----------------------------<  129<H>  4.6   |  31  |  2.09<H>    Ca    8.9      01 May 2024 12:00  Phos  6.8     05-01  Mg     2.4     05-01    TPro  7.3  /  Alb  3.8  /  TBili  0.3  /  DBili  x   /  AST  15  /  ALT  7<L>  /  AlkPhos  82  05-01    PT/INR - ( 30 Apr 2024 13:23 )   PT: 12.7 sec;   INR: 1.16          PTT - ( 30 Apr 2024 13:23 )  PTT:28.2 sec  Urinalysis Basic - ( 01 May 2024 12:00 )    Color: x / Appearance: x / SG: x / pH: x  Gluc: 129 mg/dL / Ketone: x  / Bili: x / Urobili: x   Blood: x / Protein: x / Nitrite: x   Leuk Esterase: x / RBC: x / WBC x   Sq Epi: x / Non Sq Epi: x / Bacteria: x      CAPILLARY BLOOD GLUCOSE      POCT Blood Glucose.: 140 mg/dL (02 May 2024 05:33)      RADIOLOGY & ADDITIONAL TESTS: Reviewed.   OVERNIGHT EVENTS: on BIPAP    SUBJECTIVE / INTERVAL HPI: Patient seen and examined at bedside. Patient found laying in bed sleeping wearing BIPAP, woke easily to voice. Reports no trouble using BIPAP overnight. Says his breathing has improved since his admission and overall he feels well.     VITAL SIGNS:  Vital Signs Last 24 Hrs  T(C): 36.7 (02 May 2024 06:27), Max: 36.9 (01 May 2024 15:06)  T(F): 98 (02 May 2024 06:27), Max: 98.5 (02 May 2024 00:30)  HR: 62 (02 May 2024 05:21) (62 - 78)  BP: 133/61 (02 May 2024 05:21) (117/59 - 152/80)  BP(mean): 88 (02 May 2024 05:21) (83 - 110)  RR: 18 (02 May 2024 05:21) (16 - 24)  SpO2: 97% (02 May 2024 05:21) (94% - 98%)    Parameters below as of 02 May 2024 05:21  Patient On (Oxygen Delivery Method): BiPAP/CPAP        PHYSICAL EXAM:    General: obese patient, alert, in no acute distress, on HFNC 50/50  HEENT: NC/AT; PERRL, anicteric sclera; MMM  Neck: supple  Cardiovascular: +S1/S2, RRR  Respiratory: expiratory wheezes and crackles all over   Gastrointestinal: soft, NT/distended ; +BSx4  Extremities: WWP; no edema, clubbing or cyanosis  Vascular: 2+ radial, DP/PT pulses B/L  Neurological: AAOx3; no focal deficits    MEDICATIONS:  MEDICATIONS  (STANDING):  albuterol/ipratropium for Nebulization 3 milliLiter(s) Nebulizer every 6 hours  azithromycin  IVPB 500 milliGRAM(s) IV Intermittent every 24 hours  cefTRIAXone   IVPB 1000 milliGRAM(s) IV Intermittent every 24 hours  dextrose 10% Bolus 125 milliLiter(s) IV Bolus once  dextrose 5%. 1000 milliLiter(s) (50 mL/Hr) IV Continuous <Continuous>  dextrose 5%. 1000 milliLiter(s) (100 mL/Hr) IV Continuous <Continuous>  dextrose 50% Injectable 25 Gram(s) IV Push once  dextrose 50% Injectable 12.5 Gram(s) IV Push once  furosemide   Injectable 40 milliGRAM(s) IV Push every 12 hours  glucagon  Injectable 1 milliGRAM(s) IntraMuscular once  heparin   Injectable 7500 Unit(s) SubCutaneous every 8 hours  influenza   Vaccine 0.5 milliLiter(s) IntraMuscular once  insulin lispro (ADMELOG) corrective regimen sliding scale   SubCutaneous every 6 hours  lactated ringers. 500 milliLiter(s) (100 mL/Hr) IV Continuous <Continuous>  methylPREDNISolone sodium succinate Injectable 40 milliGRAM(s) IV Push every 12 hours    MEDICATIONS  (PRN):  dextrose Oral Gel 15 Gram(s) Oral once PRN Blood Glucose LESS THAN 70 milliGRAM(s)/deciliter      ALLERGIES:  Allergies    No Known Allergies    Intolerances        LABS:                        7.6    14.50 )-----------( 233      ( 01 May 2024 05:30 )             32.1     05-01    142  |  100  |  24<H>  ----------------------------<  129<H>  4.6   |  31  |  2.09<H>    Ca    8.9      01 May 2024 12:00  Phos  6.8     05-01  Mg     2.4     05-01    TPro  7.3  /  Alb  3.8  /  TBili  0.3  /  DBili  x   /  AST  15  /  ALT  7<L>  /  AlkPhos  82  05-01    PT/INR - ( 30 Apr 2024 13:23 )   PT: 12.7 sec;   INR: 1.16          PTT - ( 30 Apr 2024 13:23 )  PTT:28.2 sec  Urinalysis Basic - ( 01 May 2024 12:00 )    Color: x / Appearance: x / SG: x / pH: x  Gluc: 129 mg/dL / Ketone: x  / Bili: x / Urobili: x   Blood: x / Protein: x / Nitrite: x   Leuk Esterase: x / RBC: x / WBC x   Sq Epi: x / Non Sq Epi: x / Bacteria: x      CAPILLARY BLOOD GLUCOSE      POCT Blood Glucose.: 140 mg/dL (02 May 2024 05:33)      RADIOLOGY & ADDITIONAL TESTS: Reviewed.

## 2024-05-02 NOTE — CONSULT NOTE ADULT - ASSESSMENT
58 y/o M current smoker with obstructive lung disease presented with worsening dyspnea and productive cough currently admitted to [facility] for acute hypercapnic hypoxic respiratory failure, suspected due to asthma exacerbation in setting of CAP. PH team consulted for PASP 85 on echocardiogram suggestive of pulmonary hypertension. His CT also shows enlarged PA and suggests mosaic attenuation. Notably he has RUCHI which can worsen with overdiuresis and his IVS is D-shaped in systole only, which suggests high pressures rather than volume.     Patient certainly at high risk for PH but given his active obstructive airways disease, CAP and hypercapnia, invasive testing for PH will not be reliable at this time, and would be considered if he was not improving despite optimization of these comorbidities. He is slowly improving clinically. He has eosinophilia which should be worked up (outlined below). Otherwise would not recommend further diuretics at this time based on ECHO findings supportive more pressure overload than volume, and risk for worsening RUCHI. Notably he needs a PFT, smoking cessation counseling, and is eligible for lung cancer screening, all of which needs to be addressed in the outpatient setting, and he is not planning to follow up here since he lives in [place].     Plan:  - Eosinophilia workup:       - Connective tissue dx/autoimmune/vasculitis: MALENA, ANCA, anti-CCP, RF, IgE level, anti-SSA, anti-Scl70, anti-myomarker panel, CRP      - Infectious causes:  aspergillosis IgG, serum GM, strongyloides ab, stool for O&P, peripheral blood smear for parasites  - Wean HFNC/BiPAP as tolerated  - Follow up with outpatient pulmonology, unfortunately he is unable to follow up with us  - Continue with abx, steroids per primary team   58 y/o M current smoker with obstructive lung disease presented with worsening dyspnea and productive cough currently admitted to [facility] for acute hypercapnic hypoxic respiratory failure, suspected due to asthma exacerbation in setting of CAP. PH team consulted for PASP 85 on echocardiogram suggestive of pulmonary hypertension. His CT also shows enlarged PA and suggests mosaic attenuation. Notably he has RUCHI which can worsen with overdiuresis and his IVS is D-shaped in systole only, which suggests high pressures rather than volume. On exam he continues to have diffuse wheezing and rhonchi suggesting ongoing airway disease.    Patient certainly at high risk for PH but given his active obstructive airways disease, CAP and hypercapnia, invasive testing for PH will not be reliable at this time, and would be considered if he was not improving despite optimization of these comorbidities. He is slowly improving clinically. He has eosinophilia which should be worked up (outlined below), now resolved after steroids (to be expected). Otherwise would not recommend further diuretics at this time based on ECHO findings supportive more pressure overload than volume, and risk for worsening RUCHI. Notably he needs a PFT, smoking cessation counseling, and is eligible for lung cancer screening, all of which needs to be addressed in the outpatient setting, and he is not planning to follow up here since he lives in [place].     Plan:  - Eosinophilia workup:       - Connective tissue dx/autoimmune/vasculitis: MALENA, ANCA, anti-CCP, RF, IgE level, anti-SSA, anti-Scl70, anti-myomarker panel, CRP      - Infectious causes:  aspergillosis IgG, serum GM, strongyloides ab, stool for O&P, peripheral blood smear for parasites  - Wean HFNC/BiPAP as tolerated  - Follow up with outpatient pulmonology, unfortunately he is unable to follow up with us  - Records should be faxed to his PCP for continuation of care  - Continue with abx, steroids per primary team  - Please obtain sputum cultures as pt bringing up purulent sputum    Will consider RHC if infection and airway disease improving but patient still remains dyspneic and clinical team feels PH may be  of symptoms.    58 y/o M current smoker with obstructive lung disease presented with worsening dyspnea and productive cough currently admitted to [facility] for acute hypercapnic hypoxic respiratory failure, suspected due to asthma exacerbation in setting of CAP. PH team consulted for PASP 85 on echocardiogram suggestive of pulmonary hypertension. His CT also shows enlarged PA and suggests mosaic attenuation. Notably he has RUCHI which can worsen with overdiuresis and his IVS is D-shaped in systole only, which suggests high pressures rather than volume. On exam he continues to have diffuse wheezing and rhonchi suggesting ongoing airway disease.    Patient certainly at high risk for PH but given his active obstructive airways disease, CAP and hypercapnia, invasive testing for PH will not be reliable at this time, and would be considered if he was not improving despite optimization of these comorbidities. He is slowly improving clinically. He has eosinophilia which should be worked up (outlined below), now resolved after steroids (to be expected). Otherwise would not recommend further diuretics at this time based on ECHO findings supportive more pressure overload than volume, and risk for worsening RUCHI. Notably he needs a PFT, smoking cessation counseling, and is eligible for lung cancer screening, all of which needs to be addressed in the outpatient setting, and he is not planning to follow up here since he lives in [place].     Plan:  - Eosinophilia workup:       - Connective tissue dx/autoimmune/vasculitis: MALENA, ANCA, anti-CCP, RF, IgE level, anti-SSA, anti-Scl70, anti-myomarker panel, CRP      - Infectious causes:  aspergillosis IgG, serum GM, strongyloides ab, stool for O&P, peripheral blood smear for parasites  - Wean HFNC/BiPAP as tolerated  - Follow up with outpatient pulmonology, unfortunately he is unable to follow up with us  - Records should be faxed to his PCP for continuation of care  - Continue with abx, steroids per primary team  - Please obtain sputum cultures as pt bringing up purulent sputum    Will consider RHC if infection and airway disease improving but patient still remains dyspneic and clinical team feels PH may be  of symptoms.     Patient seen, evaluated and discussed with Dr. Chamorro  PH team will follow    Percy Rider MD  [facility] Fellow

## 2024-05-02 NOTE — PROGRESS NOTE ADULT - PROBLEM SELECTOR PLAN 8
F: none  E: replete as needed  N: npo for now  VTE ppx: heparin sq  GI ppx: none  [facility]: [facility]  Code status: FULL

## 2024-05-02 NOTE — PROGRESS NOTE ADULT - ATTENDING COMMENTS
Looks better today. Comfortable on hiflo. Lasix increased. Pulm Htn not sure if pt increased PA pressures are volume  or not. Want to w/u eosinophilia. Try to transition to NC.

## 2024-05-02 NOTE — CONSULT NOTE ADULT - SUBJECTIVE AND OBJECTIVE BOX
PULMONARY HYPERTENSION SERVICE INITIAL CONSULT NOTE    CC: Patient is a 59y old  Male who presents with a chief complaint of Asthma Exacerbation (02 May 2024 07:28)      HPI:  60 y/o M current smoker presented with 1 week of cough with yellow phlegm and worsening dyspnea. Prior to this, he was unencumbered in his regular daily activities, able to walk multiple city blocks/flights of stairs without dyspnea. He says he was diagnosed with "asthma" during a hospitalization a few years ago but notably did not do a PFT. Denies history of heart of lung problems otherwise. No travel outside or inside the , lived in [place] his whole life. 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 At risk for BLANCA but never tested.     [facility]  [address] [address], [place], [place], [address] other locations    Denies history of HIV/STI/STD. He uses albuterol pump and symbicort 1 puff daily for his obstructive airway disease. Has intermittent cough prior to this hospitalization.  Currently feels like dyspnea is improving but not back to baseline. Current smoker 0.5-1 ppd since his teens,.       ROS:  Constitutional: No fever, weight loss or fatigue  Eyes: No eye pain, visual disturbances, or discharge  ENMT:  No difficulty hearing, tinnitus, vertigo; No sinus or throat pain  Neck: No pain, stiffness or neck swelling  Respiratory: see HPI  Cardiovascular: No chest pain, palpitations, dizziness or leg swelling  Gastrointestinal: No abdominal or epigastric pain. No nausea, vomiting or hematemesis; No diarrhea or constipation. No melena or hematochezia.  Genitourinary: No dysuria, frequency, hematuria or incontinence  Neurological: No headaches, memory loss, loss of strength, numbness or tremors  Skin: No itching, burning, rashes or lesions   Lymph Nodes: No enlarged glands  Endocrine: No heat or cold intolerance; No hair loss  Musculoskeletal: Mentions "tingling" in his left hand occasionally   Psychiatric: No depression, anxiety, mood swings or difficulty sleeping  Heme/Lymph: No easy bruising or bleeding gums  Allergy and Immunologic: No hives or eczema    PAST MEDICAL & SURGICAL HISTORY:  Asthma          SOCIAL HISTORY:  Smoking Status: [ / ] Current [  ] Former [  ] Never  Pack Years: 40  EtOH Usage: N  Exposure History: N  Travel History: N    FAMILY HISTORY:  Non contributory, no lung disease or cancer    Allergies    No Known Allergies    Intolerances        MEDICATIONS:  MEDICATIONS  (STANDING):  albuterol/ipratropium for Nebulization 3 milliLiter(s) Nebulizer every 6 hours  cefTRIAXone   IVPB 1000 milliGRAM(s) IV Intermittent every 24 hours  furosemide   Injectable 60 milliGRAM(s) IV Push every 12 hours  glucagon  Injectable 1 milliGRAM(s) IntraMuscular once  heparin   Injectable 7500 Unit(s) SubCutaneous every 8 hours  influenza   Vaccine 0.5 milliLiter(s) IntraMuscular once  insulin lispro (ADMELOG) corrective regimen sliding scale   SubCutaneous Before meals and at bedtime    MEDICATIONS  (PRN):  dextrose Oral Gel 15 Gram(s) Oral once PRN Blood Glucose LESS THAN 70 milliGRAM(s)/deciliter      Vital Signs Last 24 Hrs  T(C): 36.9 (02 May 2024 10:00), Max: 36.9 (01 May 2024 15:06)  T(F): 98.5 (02 May 2024 10:00), Max: 98.5 (02 May 2024 00:30)  HR: 72 (02 May 2024 11:40) (62 - 78)  BP: 130/64 (02 May 2024 11:40) (117/59 - 152/80)  BP(mean): 91 (02 May 2024 11:40) (83 - 110)  RR: 18 (02 May 2024 11:40) (16 - 24)  SpO2: 94% (02 May 2024 11:40) (94% - 99%)    Parameters below as of 02 May 2024 11:40  Patient On (Oxygen Delivery Method): nasal cannula, high flow  O2 Flow (L/min): 50  O2 Concentration (%): 50    PHYSICAL EXAM:  Constitutional: Appears comfortable on HFNC  Head: NC/AT  EENT: PERRL, anicteric sclera; oropharynx clear, MMM  Neck: supple, no appreciable JVD  Respiratory: Diffuse crackles and wheezing   Cardiovascular: +S1/S2, RRR  Gastrointestinal: soft, NT/ND; +BSx4  Extremities: WWP; mild pitting edema  Vascular: 2+ radial, DP/PT pulses B/L  Neurological: AAOx3; no focal deficits    LABS:  ABG - ( 01 May 2024 08:52 )  pH, Arterial: 7.23  pH, Blood: x     /  pCO2: 84    /  pO2: 84    / HCO3: 35    / Base Excess: 4.7   /  SaO2: 98.4                CBC Full  -  ( 02 May 2024 05:30 )  WBC Count : 13.94 K/uL  RBC Count : 4.89 M/uL  Hemoglobin : 7.7 g/dL  Hematocrit : 33.0 %  Platelet Count - Automated : 253 K/uL  Mean Cell Volume : 67.5 fl  Mean Cell Hemoglobin : 15.7 pg  Mean Cell Hemoglobin Concentration : 23.3 gm/dL  Auto Neutrophil # : 12.89 K/uL  Auto Lymphocyte # : 0.55 K/uL  Auto Monocyte # : 0.35 K/uL  Auto Eosinophil # : 0.00 K/uL  Auto Basophil # : 0.01 K/uL  Auto Neutrophil % : 92.5 %  Auto Lymphocyte % : 3.9 %  Auto Monocyte % : 2.5 %  Auto Eosinophil % : 0.0 %  Auto Basophil % : 0.1 %    05-02    141  |  100  |  37<H>  ----------------------------<  123<H>  4.8   |  31  |  2.22<H>    Ca    9.1      02 May 2024 05:30  Phos  5.3     05-02  Mg     2.6     05-02    TPro  7.5  /  Alb  3.8  /  TBili  0.3  /  DBili  x   /  AST  11  /  ALT  7<L>  /  AlkPhos  81  05-02          Urinalysis Basic - ( 02 May 2024 05:30 )    Color: x / Appearance: x / SG: x / pH: x  Gluc: 123 mg/dL / Ketone: x  / Bili: x / Urobili: x   Blood: x / Protein: x / Nitrite: x   Leuk Esterase: x / RBC: x / WBC x   Sq Epi: x / Non Sq Epi: x / Bacteria: x          EKG:    ECHOCARDIOGRAM:    PRIOR PFT/SPIROMETRY (if available):    CHEST RADIOLOGY (CXR, CTA, CT, V/Q): PULMONARY HYPERTENSION SERVICE INITIAL CONSULT NOTE    CC: Patient is a 59y old  Male who presents with a chief complaint of Asthma Exacerbation (02 May 2024 07:28)      HPI:  60 y/o M current smoker presented with 1 week of cough with yellow phlegm and worsening dyspnea. Prior to this, he was unencumbered in his regular daily activities, able to walk multiple city blocks/flights of stairs without dyspnea. He says he was diagnosed with "asthma" during a hospitalization a few years ago but notably did not do a PFT. Denies history of heart of lung problems otherwise. No travel outside or inside the , lived in [place] his whole life. 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 At risk for BLANCA but never tested.     [facility]  [address] [address], [place], [place], [id number] other locations    Denies history of HIV/STI/STD. He uses albuterol pump and symbicort 1 puff daily for his obstructive airway disease. Has intermittent cough prior to this hospitalization.  Currently feels like dyspnea is improving but not back to baseline. Current smoker 0.5-1 ppd since his teens,.       ROS:  Constitutional: No fever, weight loss or fatigue  Eyes: No eye pain, visual disturbances, or discharge  ENMT:  No difficulty hearing, tinnitus, vertigo; No sinus or throat pain  Neck: No pain, stiffness or neck swelling  Respiratory: see HPI  Cardiovascular: No chest pain, palpitations, dizziness or leg swelling  Gastrointestinal: No abdominal or epigastric pain. No nausea, vomiting or hematemesis; No diarrhea or constipation. No melena or hematochezia.  Genitourinary: No dysuria, frequency, hematuria or incontinence  Neurological: No headaches, memory loss, loss of strength, numbness or tremors  Skin: No itching, burning, rashes or lesions   Lymph Nodes: No enlarged glands  Endocrine: No heat or cold intolerance; No hair loss  Musculoskeletal: Mentions "tingling" in his left hand occasionally   Psychiatric: No depression, anxiety, mood swings or difficulty sleeping  Heme/Lymph: No easy bruising or bleeding gums  Allergy and Immunologic: No hives or eczema    PAST MEDICAL & SURGICAL HISTORY:  Asthma          SOCIAL HISTORY:  Smoking Status: [ / ] Current [  ] Former [  ] Never  Pack Years: 40  EtOH Usage: N  Exposure History: N  Travel History: N    FAMILY HISTORY:  Non contributory, no lung disease or cancer    Allergies    No Known Allergies    Intolerances        MEDICATIONS:  MEDICATIONS  (STANDING):  albuterol/ipratropium for Nebulization 3 milliLiter(s) Nebulizer every 6 hours  cefTRIAXone   IVPB 1000 milliGRAM(s) IV Intermittent every 24 hours  furosemide   Injectable 60 milliGRAM(s) IV Push every 12 hours  glucagon  Injectable 1 milliGRAM(s) IntraMuscular once  heparin   Injectable 7500 Unit(s) SubCutaneous every 8 hours  influenza   Vaccine 0.5 milliLiter(s) IntraMuscular once  insulin lispro (ADMELOG) corrective regimen sliding scale   SubCutaneous Before meals and at bedtime    MEDICATIONS  (PRN):  dextrose Oral Gel 15 Gram(s) Oral once PRN Blood Glucose LESS THAN 70 milliGRAM(s)/deciliter      Vital Signs Last 24 Hrs  T(C): 36.9 (02 May 2024 10:00), Max: 36.9 (01 May 2024 15:06)  T(F): 98.5 (02 May 2024 10:00), Max: 98.5 (02 May 2024 00:30)  HR: 72 (02 May 2024 11:40) (62 - 78)  BP: 130/64 (02 May 2024 11:40) (117/59 - 152/80)  BP(mean): 91 (02 May 2024 11:40) (83 - 110)  RR: 18 (02 May 2024 11:40) (16 - 24)  SpO2: 94% (02 May 2024 11:40) (94% - 99%)    Parameters below as of 02 May 2024 11:40  Patient On (Oxygen Delivery Method): nasal cannula, high flow  O2 Flow (L/min): 50  O2 Concentration (%): 50    PHYSICAL EXAM:  Constitutional: Appears comfortable on HFNC  Head: NC/AT  EENT: PERRL, anicteric sclera; oropharynx clear, MMM  Neck: supple, no appreciable JVD  Respiratory: Diffuse crackles and wheezing   Cardiovascular: +S1/S2, RRR  Gastrointestinal: soft, NT/ND; +BSx4  Extremities: WWP; mild pitting edema  Vascular: 2+ radial, DP/PT pulses B/L  Neurological: AAOx3; no focal deficits    LABS:  ABG - ( 01 May 2024 08:52 )  pH, Arterial: 7.23  pH, Blood: x     /  pCO2: 84    /  pO2: 84    / HCO3: 35    / Base Excess: 4.7   /  SaO2: 98.4                CBC Full  -  ( 02 May 2024 05:30 )  WBC Count : 13.94 K/uL  RBC Count : 4.89 M/uL  Hemoglobin : 7.7 g/dL  Hematocrit : 33.0 %  Platelet Count - Automated : 253 K/uL  Mean Cell Volume : 67.5 fl  Mean Cell Hemoglobin : 15.7 pg  Mean Cell Hemoglobin Concentration : 23.3 gm/dL  Auto Neutrophil # : 12.89 K/uL  Auto Lymphocyte # : 0.55 K/uL  Auto Monocyte # : 0.35 K/uL  Auto Eosinophil # : 0.00 K/uL  Auto Basophil # : 0.01 K/uL  Auto Neutrophil % : 92.5 %  Auto Lymphocyte % : 3.9 %  Auto Monocyte % : 2.5 %  Auto Eosinophil % : 0.0 %  Auto Basophil % : 0.1 %    05-02    141  |  100  |  37<H>  ----------------------------<  123<H>  4.8   |  31  |  2.22<H>    Ca    9.1      02 May 2024 05:30  Phos  5.3     05-02  Mg     2.6     05-02    TPro  7.5  /  Alb  3.8  /  TBili  0.3  /  DBili  x   /  AST  11  /  ALT  7<L>  /  AlkPhos  81  05-02          Urinalysis Basic - ( 02 May 2024 05:30 )    Color: x / Appearance: x / SG: x / pH: x  Gluc: 123 mg/dL / Ketone: x  / Bili: x / Urobili: x   Blood: x / Protein: x / Nitrite: x   Leuk Esterase: x / RBC: x / WBC x   Sq Epi: x / Non Sq Epi: x / Bacteria: x      ECHOCARDIOGRAM:  5/1/24  D shaped septum in systole, suggests pressure problem. Normal RV size and function with TAPSE 31  Elevated PASP (TRV 4.19, RAP 15)  Hyperdynamic LV with EF 75% and RUCHI, with LVOT gradient 17-49  Trace MR    CT:  4/30  - enlarged PA 4cm, bilateral LL consolidations R>L, and possible mosaic attenuation, difficult to assess due to motion artifact

## 2024-05-02 NOTE — PHYSICAL THERAPY INITIAL EVALUATION ADULT - DIAGNOSIS, PT EVAL
Jesus Coffman is a 70 year old male here for  Chief Complaint   Patient presents with    Cancer     Acute myeloid leukemia    Office Visit    TREATMENT     AZACITIDINE [id number]     Denies latex allergy or sensitivity.    Medication verified, no changes.  PCP and Pharmacy verified.    Social History     Tobacco Use   Smoking Status Former    Types: Cigarettes    Start date:     Quit date: 1970    Years since quittin.1    Passive exposure: Never   Smokeless Tobacco Never     Advance Directives Filed: No    ECOG:   ECOG [24 1500]   ECOG Performance Status 1       Vitals:    Visit Vitals  BP (!) 141/77 (BP Location: RUE - Right upper extremity, Patient Position: Sitting, Cuff Size: Regular)   Pulse 73   Temp 98.8 °F (37.1 °C) (Temporal)   Resp 14   Wt (!) 147.8 kg (325 lb 15.2 oz)   SpO2 95%   BMI 46.77 kg/m²         Height: No.  Ht Readings from Last 1 Encounters:   24 5' 10\" (1.778 m)     Weight:Yes, shoes on.  Wt Readings from Last 3 Encounters:   24 (!) 147.8 kg (325 lb 15.2 oz)   24 (!) 146.8 kg (323 lb 10.2 oz)   24 (!) 147 kg (324 lb 1.2 oz)       BMI: Body mass index is 46.77 kg/m².    REVIEW OF SYSTEMS  GENERAL:  Patient denies headache, fevers, chills, night sweats, excessive fatigue, change in appetite, weight loss, dizziness  ALLERGIC/IMMUNOLOGIC: Verified allergies: Yes  EYES:  Patient denies significant visual difficulties, double vision, blurred vision  ENT/MOUTH: Patient denies problems with hearing, sore throat, mouth sores, but complains of: sinus drainage  ENDOCRINE:  Patient denies diabetes, thyroid disease, hormone replacement, hot flashes  HEMATOLOGIC/LYMPHATIC: Patient denies easy bruising, bleeding, tender lymph nodes, swollen lymph nodes  BREASTS: Patient denies abnormal masses of breast, nipple discharge, pain  RESPIRATORY:  Patient denies lung pain with breathing, cough, coughing up blood, but complains of: shortness of breath with activity    CARDIOVASCULAR:  Patient denies anginal chest pain, palpitations, shortness of breath when lying flat, peripheral edema  GASTROINTESTINAL: Patient denies abdominal pain , nausea, vomiting, diarrhea, GI bleeding, constipation, change in bowel habits, heartburn, sensation of feeling full, difficulty swallowing  : Patient denies blood in the urine, burning with urination, frequency, urgency, hesitancy, incontinence  MUSCULOSKELETAL:  Patient denies joint pain, bone pain, joint swelling, redness, decreased range of motion  SKIN:  Patient denies chronic rashes, inflammation, ulcerations, skin changes, itching  NEUROLOGIC:  Patient denies loss of balance, areas of focal weakness, abnormal gait, sensory problems, numbness, tingling  PSYCHIATRIC: Patient denies depression, anxiety, but complains of: insomnia. Patient reports sleep apnea machine is broken       6B: Impaired Aerobic Capacity/Endurance Associated with Deconditioning

## 2024-05-02 NOTE — PROGRESS NOTE ADULT - PROBLEM SELECTOR PLAN 7
F: none  E: replete as needed  N: npo for now  VTE ppx: heparin sq  GI ppx: none  [facility]: [facility]  Code status: FULL home medication(s): lipitor, does not recall dose  - medication reconciliation in am, resume as-appropriate

## 2024-05-03 LAB
ALBUMIN SERPL ELPH-MCNC: 3.6 G/DL — SIGNIFICANT CHANGE UP (ref 3.3–5)
ALP SERPL-CCNC: 78 U/L — SIGNIFICANT CHANGE UP (ref 40–120)
ALT FLD-CCNC: 5 U/L — LOW (ref 10–45)
ANION GAP SERPL CALC-SCNC: 9 MMOL/L — SIGNIFICANT CHANGE UP (ref 5–17)
ANISOCYTOSIS BLD QL: SIGNIFICANT CHANGE UP
AST SERPL-CCNC: 14 U/L — SIGNIFICANT CHANGE UP (ref 10–40)
BASOPHILS # BLD AUTO: 0 K/UL — SIGNIFICANT CHANGE UP (ref 0–0.2)
BASOPHILS NFR BLD AUTO: 0 % — SIGNIFICANT CHANGE UP (ref 0–2)
BILIRUB SERPL-MCNC: 0.2 MG/DL — SIGNIFICANT CHANGE UP (ref 0.2–1.2)
BUN SERPL-MCNC: 49 MG/DL — HIGH (ref 7–23)
CALCIUM SERPL-MCNC: 8.8 MG/DL — SIGNIFICANT CHANGE UP (ref 8.4–10.5)
CCP IGG SERPL-ACNC: <8 UNITS — SIGNIFICANT CHANGE UP
CHLORIDE SERPL-SCNC: 99 MMOL/L — SIGNIFICANT CHANGE UP (ref 96–108)
CO2 SERPL-SCNC: 33 MMOL/L — HIGH (ref 22–31)
CREAT SERPL-MCNC: 1.94 MG/DL — HIGH (ref 0.5–1.3)
CRP SERPL-MCNC: 6 MG/L — HIGH (ref 0–4)
CYSTATIN C SERPL-MCNC: 2.25 MG/L — HIGH (ref 0.77–1.42)
DACRYOCYTES BLD QL SMEAR: SLIGHT — SIGNIFICANT CHANGE UP
EGFR: 39 ML/MIN/1.73M2 — LOW
ELLIPTOCYTES BLD QL SMEAR: SLIGHT — SIGNIFICANT CHANGE UP
ENA SCL70 AB SER-ACNC: <0.2 AI — SIGNIFICANT CHANGE UP
EOSINOPHIL # BLD AUTO: 0 K/UL — SIGNIFICANT CHANGE UP (ref 0–0.5)
EOSINOPHIL NFR BLD AUTO: 0 % — SIGNIFICANT CHANGE UP (ref 0–6)
GFR/BSA.PRED SERPLBLD CYS-BASED-ARV: 26 ML/MIN/1.73M2 — LOW
GLUCOSE BLDC GLUCOMTR-MCNC: 118 MG/DL — HIGH (ref 70–99)
GLUCOSE BLDC GLUCOMTR-MCNC: 188 MG/DL — HIGH (ref 70–99)
GLUCOSE BLDC GLUCOMTR-MCNC: 199 MG/DL — HIGH (ref 70–99)
GLUCOSE BLDC GLUCOMTR-MCNC: 218 MG/DL — HIGH (ref 70–99)
GLUCOSE SERPL-MCNC: 97 MG/DL — SIGNIFICANT CHANGE UP (ref 70–99)
HCT VFR BLD CALC: 29.7 % — LOW (ref 39–50)
HGB BLD-MCNC: 7.3 G/DL — LOW (ref 13–17)
HYPOCHROMIA BLD QL: SIGNIFICANT CHANGE UP
LYMPHOCYTES # BLD AUTO: 15.8 % — SIGNIFICANT CHANGE UP (ref 13–44)
LYMPHOCYTES # BLD AUTO: 2.31 K/UL — SIGNIFICANT CHANGE UP (ref 1–3.3)
MAGNESIUM SERPL-MCNC: 2.3 MG/DL — SIGNIFICANT CHANGE UP (ref 1.6–2.6)
MANUAL SMEAR VERIFICATION: SIGNIFICANT CHANGE UP
MCHC RBC-ENTMCNC: 15.9 PG — LOW (ref 27–34)
MCHC RBC-ENTMCNC: 24.6 GM/DL — LOW (ref 32–36)
MCV RBC AUTO: 64.8 FL — LOW (ref 80–100)
MICROCYTES BLD QL: SIGNIFICANT CHANGE UP
MONOCYTES # BLD AUTO: 0.38 K/UL — SIGNIFICANT CHANGE UP (ref 0–0.9)
MONOCYTES NFR BLD AUTO: 2.6 % — SIGNIFICANT CHANGE UP (ref 2–14)
NEUTROPHILS # BLD AUTO: 11.93 K/UL — HIGH (ref 1.8–7.4)
NEUTROPHILS NFR BLD AUTO: 81.6 % — HIGH (ref 43–77)
NRBC # BLD: 1 /100 WBCS — HIGH (ref 0–0)
NRBC # BLD: SIGNIFICANT CHANGE UP /100 WBCS (ref 0–0)
PHOSPHATE SERPL-MCNC: 3.1 MG/DL — SIGNIFICANT CHANGE UP (ref 2.5–4.5)
PLAT MORPH BLD: NORMAL — SIGNIFICANT CHANGE UP
PLATELET # BLD AUTO: 266 K/UL — SIGNIFICANT CHANGE UP (ref 150–400)
POIKILOCYTOSIS BLD QL AUTO: SIGNIFICANT CHANGE UP
POLYCHROMASIA BLD QL SMEAR: SLIGHT — SIGNIFICANT CHANGE UP
POTASSIUM SERPL-MCNC: 4.5 MMOL/L — SIGNIFICANT CHANGE UP (ref 3.5–5.3)
POTASSIUM SERPL-SCNC: 4.5 MMOL/L — SIGNIFICANT CHANGE UP (ref 3.5–5.3)
PROT SERPL-MCNC: 7.2 G/DL — SIGNIFICANT CHANGE UP (ref 6–8.3)
RBC # BLD: 4.58 M/UL — SIGNIFICANT CHANGE UP (ref 4.2–5.8)
RBC # FLD: 25.4 % — HIGH (ref 10.3–14.5)
RBC BLD AUTO: ABNORMAL
RF+CCP IGG SER-IMP: NEGATIVE — SIGNIFICANT CHANGE UP
SODIUM SERPL-SCNC: 141 MMOL/L — SIGNIFICANT CHANGE UP (ref 135–145)
SPHEROCYTES BLD QL SMEAR: SLIGHT — SIGNIFICANT CHANGE UP
TARGETS BLD QL SMEAR: SLIGHT — SIGNIFICANT CHANGE UP
WBC # BLD: 14.62 K/UL — HIGH (ref 3.8–10.5)
WBC # FLD AUTO: 14.62 K/UL — HIGH (ref 3.8–10.5)

## 2024-05-03 PROCEDURE — 99231 SBSQ HOSP IP/OBS SF/LOW 25: CPT

## 2024-05-03 PROCEDURE — 99233 SBSQ HOSP IP/OBS HIGH 50: CPT

## 2024-05-03 RX ADMIN — Medication 1: at 12:07

## 2024-05-03 RX ADMIN — HEPARIN SODIUM 7500 UNIT(S): 5000 INJECTION INTRAVENOUS; SUBCUTANEOUS at 07:01

## 2024-05-03 RX ADMIN — HEPARIN SODIUM 7500 UNIT(S): 5000 INJECTION INTRAVENOUS; SUBCUTANEOUS at 16:56

## 2024-05-03 RX ADMIN — Medication 3 MILLILITER(S): at 21:07

## 2024-05-03 RX ADMIN — CEFTRIAXONE 100 MILLIGRAM(S): 500 INJECTION, POWDER, FOR SOLUTION INTRAMUSCULAR; INTRAVENOUS at 12:07

## 2024-05-03 RX ADMIN — Medication 1: at 16:56

## 2024-05-03 RX ADMIN — Medication 2: at 22:27

## 2024-05-03 RX ADMIN — Medication 3 MILLILITER(S): at 02:33

## 2024-05-03 RX ADMIN — HEPARIN SODIUM 7500 UNIT(S): 5000 INJECTION INTRAVENOUS; SUBCUTANEOUS at 00:14

## 2024-05-03 RX ADMIN — Medication 3 MILLILITER(S): at 10:26

## 2024-05-03 RX ADMIN — Medication 40 MILLIGRAM(S): at 07:00

## 2024-05-03 RX ADMIN — Medication 3 MILLILITER(S): at 15:35

## 2024-05-03 NOTE — PROGRESS NOTE ADULT - SUBJECTIVE AND OBJECTIVE BOX
OVERNIGHT EVENTS:    SUBJECTIVE / INTERVAL HPI: Patient seen and examined at bedside.     VITAL SIGNS:  Vital Signs Last 24 Hrs  T(C): 37.5 (03 May 2024 05:00), Max: 37.5 (03 May 2024 05:00)  T(F): 99.5 (03 May 2024 05:00), Max: 99.5 (03 May 2024 05:00)  HR: 83 (03 May 2024 05:57) (66 - 98)  BP: 136/79 (03 May 2024 04:02) (122/64 - 148/79)  BP(mean): 99 (03 May 2024 04:02) (87 - 104)  RR: 16 (03 May 2024 05:57) (15 - 19)  SpO2: 98% (03 May 2024 05:57) (94% - 99%)    Parameters below as of 03 May 2024 05:57  Patient On (Oxygen Delivery Method): nasal cannula, high flow  O2 Flow (L/min): 50  O2 Concentration (%): 50    PHYSICAL EXAM:    General: NAD  HEENT: NC/AT; PERRL, anicteric sclera; MMM  Neck: supple w/o palpable nodularity  Cardiovascular: +S1/S2; RRR  Respiratory: CTA B/L; no W/R/R  Gastrointestinal: soft, NT/ND; +BSx4  Extremities: WWP; no edema, clubbing or cyanosis  Vascular: 2+ radial, DP/PT pulses B/L  Neurological: AAOx3; no focal deficits    MEDICATIONS:  MEDICATIONS  (STANDING):  albuterol/ipratropium for Nebulization 3 milliLiter(s) Nebulizer every 6 hours  cefTRIAXone   IVPB 1000 milliGRAM(s) IV Intermittent every 24 hours  dextrose 10% Bolus 125 milliLiter(s) IV Bolus once  dextrose 5%. 1000 milliLiter(s) (50 mL/Hr) IV Continuous <Continuous>  dextrose 5%. 1000 milliLiter(s) (100 mL/Hr) IV Continuous <Continuous>  dextrose 50% Injectable 25 Gram(s) IV Push once  dextrose 50% Injectable 12.5 Gram(s) IV Push once  glucagon  Injectable 1 milliGRAM(s) IntraMuscular once  heparin   Injectable 7500 Unit(s) SubCutaneous every 8 hours  influenza   Vaccine 0.5 milliLiter(s) IntraMuscular once  insulin lispro (ADMELOG) corrective regimen sliding scale   SubCutaneous Before meals and at bedtime  methylPREDNISolone sodium succinate Injectable 40 milliGRAM(s) IV Push every 24 hours    MEDICATIONS  (PRN):  dextrose Oral Gel 15 Gram(s) Oral once PRN Blood Glucose LESS THAN 70 milliGRAM(s)/deciliter      ALLERGIES:  Allergies    No Known Allergies    Intolerances        LABS:                        7.3    14.62 )-----------( 266      ( 03 May 2024 05:30 )             29.7     05-03    141  |  99  |  x   ----------------------------<  97  4.5   |  x   |  x     Ca    8.8      03 May 2024 05:30  Phos  3.1     05-03  Mg     2.3     05-03    TPro  7.5  /  Alb  3.8  /  TBili  0.3  /  DBili  x   /  AST  11  /  ALT  7<L>  /  AlkPhos  81  05-02      Urinalysis Basic - ( 03 May 2024 05:30 )    Color: x / Appearance: x / SG: x / pH: x  Gluc: 97 mg/dL / Ketone: x  / Bili: x / Urobili: x   Blood: x / Protein: x / Nitrite: x   Leuk Esterase: x / RBC: x / WBC x   Sq Epi: x / Non Sq Epi: x / Bacteria: x      CAPILLARY BLOOD GLUCOSE      POCT Blood Glucose.: 118 mg/dL (03 May 2024 06:04)      RADIOLOGY & ADDITIONAL TESTS: Reviewed.   OVERNIGHT EVENTS: SAÚL    SUBJECTIVE / INTERVAL HPI: Patient seen and examined at bedside. Patient found laying in bed awake and alert, wearing HFNC. He reports feeling better and was able to use BIPAP overnight intermittently. ROS negative.     VITAL SIGNS:  Vital Signs Last 24 Hrs  T(C): 37.5 (03 May 2024 05:00), Max: 37.5 (03 May 2024 05:00)  T(F): 99.5 (03 May 2024 05:00), Max: 99.5 (03 May 2024 05:00)  HR: 83 (03 May 2024 05:57) (66 - 98)  BP: 136/79 (03 May 2024 04:02) (122/64 - 148/79)  BP(mean): 99 (03 May 2024 04:02) (87 - 104)  RR: 16 (03 May 2024 05:57) (15 - 19)  SpO2: 98% (03 May 2024 05:57) (94% - 99%)    Parameters below as of 03 May 2024 05:57  Patient On (Oxygen Delivery Method): nasal cannula, high flow  O2 Flow (L/min): 50  O2 Concentration (%): 50    PHYSICAL EXAM:    General: obese patient, alert, in no acute distress, on HFNC 50/50  HEENT: NC/AT; PERRL, anicteric sclera; MMM  Neck: supple  Cardiovascular: +S1/S2, RRR  Respiratory: expiratory wheezes and crackles all over   Gastrointestinal: soft, NT/distended ; +BSx4  Extremities: WWP; no edema, clubbing or cyanosis  Vascular: 2+ radial, DP/PT pulses B/L  Neurological: AAOx3; no focal deficits    MEDICATIONS:  MEDICATIONS  (STANDING):  albuterol/ipratropium for Nebulization 3 milliLiter(s) Nebulizer every 6 hours  cefTRIAXone   IVPB 1000 milliGRAM(s) IV Intermittent every 24 hours  dextrose 10% Bolus 125 milliLiter(s) IV Bolus once  dextrose 5%. 1000 milliLiter(s) (50 mL/Hr) IV Continuous <Continuous>  dextrose 5%. 1000 milliLiter(s) (100 mL/Hr) IV Continuous <Continuous>  dextrose 50% Injectable 25 Gram(s) IV Push once  dextrose 50% Injectable 12.5 Gram(s) IV Push once  glucagon  Injectable 1 milliGRAM(s) IntraMuscular once  heparin   Injectable 7500 Unit(s) SubCutaneous every 8 hours  influenza   Vaccine 0.5 milliLiter(s) IntraMuscular once  insulin lispro (ADMELOG) corrective regimen sliding scale   SubCutaneous Before meals and at bedtime  methylPREDNISolone sodium succinate Injectable 40 milliGRAM(s) IV Push every 24 hours    MEDICATIONS  (PRN):  dextrose Oral Gel 15 Gram(s) Oral once PRN Blood Glucose LESS THAN 70 milliGRAM(s)/deciliter      ALLERGIES:  Allergies    No Known Allergies    Intolerances        LABS:                        7.3    14.62 )-----------( 266      ( 03 May 2024 05:30 )             29.7     05-03    141  |  99  |  x   ----------------------------<  97  4.5   |  x   |  x     Ca    8.8      03 May 2024 05:30  Phos  3.1     05-03  Mg     2.3     05-03    TPro  7.5  /  Alb  3.8  /  TBili  0.3  /  DBili  x   /  AST  11  /  ALT  7<L>  /  AlkPhos  81  05-02      Urinalysis Basic - ( 03 May 2024 05:30 )    Color: x / Appearance: x / SG: x / pH: x  Gluc: 97 mg/dL / Ketone: x  / Bili: x / Urobili: x   Blood: x / Protein: x / Nitrite: x   Leuk Esterase: x / RBC: x / WBC x   Sq Epi: x / Non Sq Epi: x / Bacteria: x      CAPILLARY BLOOD GLUCOSE      POCT Blood Glucose.: 118 mg/dL (03 May 2024 06:04)      RADIOLOGY & ADDITIONAL TESTS: Reviewed.

## 2024-05-03 NOTE — PROGRESS NOTE ADULT - PROBLEM SELECTOR PLAN 1
AHRF w hypercapnia, spo2 76% on presentation  complaint of several days cough, sob  imaging in ed w bilateral pna  leukocytosis (WBC 11.99)  currently on bipap support  repeat abg w ph 7.3, pco2 68, po2 145, hco3 34  RVP (-)   Legionella (-)  TTE wnl  - HFNC weaned to NC  - solumedrol 40 iv bid  - duonebs q6h  - dc'd azithro  - c/w CTX  - BCx NGTD AHRF w hypercapnia, spo2 76% on presentation  CXR w bilateral pna, leukocytosis (WBC 11.99)  currently on bipap support  repeat abg w ph 7.3, pco2 68, po2 145, hco3 34  RVP (-)   Legionella (-)  TTE wnl  - HFNC weaned to NC  - solumedrol switched to prednisone 40 qd  - duonebs q6h  - c/w CTX  - BCx NGTD

## 2024-05-03 NOTE — PROGRESS NOTE ADULT - ASSESSMENT
59y Male admitted for [facility] Nephrology was consulted for non-oliguric IVANNA likely from hypoperfusion in setting of NSAID and diuresis use. Cre improving.    # Non oliguric IVANNA with unknown baseline renal function: - improving   Patient presented with Cre 1.5, peak 2.2 with unknown baseline. IVANNA appears to be multifactorial but likely from renal hypoperfusion in setting of diuretic use and residual NSAID. Incidentally fount to have a 4.3 cm right renal lesion seen on partial kidney imaging.     Plan:   Avoid diuretic, NSAID   Monitor I/Os   Continue trending Cre daily   Please Obtain renal US / bladder scans to r/o post renal causes and urinary retention   Please obtain cystatin C levels  Check UPC, UAC and urine electrolytes   Renally dose medications to GFR 35  Rest per primary team      59y Male admitted for [facility] Nephrology was consulted for non-oliguric IVANNA likely from hypoperfusion in setting of NSAID and diuresis use. Cre improving.    # Non oliguric IVANNA with unknown baseline renal function: - improving   Patient presented with Cre 1.5, peak 2.2 with unknown baseline. IVANNA appears to be multifactorial but likely from renal hypoperfusion in setting of diuretic use/ residual NSAID/Contrast induced. Incidentally fount to have a 4.3 cm right renal lesion seen on partial kidney imaging.     Plan:   Avoid volume depletion  Avoid nephrotoxins such as  NSAIDs  Monitor I/Os   Continue trending Cre daily   Please Obtain renal US / bladder scans to r/o post renal causes and urinary retention   Please obtain cystatin C levels  Check UPC, UAC and urine electrolytes   Renally dose medications to GFR 35  Rest per primary team

## 2024-05-03 NOTE — PROGRESS NOTE ADULT - PROBLEM SELECTOR PLAN 4
Cr 1.59 on arrival to ED, repeat 2.06 after 12h, sp lasix & contrast in ED. FeNa 1.9. Crt uptrending.   - Renal consulted, appreciate recs  - F/u renal US/bladder scan to r/o post-renal causes and urinary retention  - f/u cystatin c  - trend cr, follow up urine studies  - renally dose medications, avoid nephrotoxins Cr 1.59 on arrival to ED, repeat 2.06 after 12h, sp lasix & contrast in ED. FeNa 1.9. Crt downtrending.  Cystatin c GFR 26  - Appreciate renal recs  - F/u renal US/bladder scan to r/o post-renal causes and urinary retention  - trend cr, follow up urine studies  - renally dose medications, avoid nephrotoxins

## 2024-05-03 NOTE — PROGRESS NOTE ADULT - PROBLEM SELECTOR PLAN 5
Pt presents with hemoglobin 7.6. Unclear etiology. Iron studies showing iron deficiency.  - f/u FOBT  - Can give iron after resolution of infection

## 2024-05-03 NOTE — OCCUPATIONAL THERAPY INITIAL EVALUATION ADULT - LIVES WITH, PROFILE
Pt lives with his family in apt. Pt at baseline is ind for ADLs and functional mobility. No DME needed.

## 2024-05-03 NOTE — PROGRESS NOTE ADULT - PROBLEM SELECTOR PLAN 8
F: none  E: replete as needed  N: npo for now  VTE ppx: heparin sq  GI ppx: none  [facility]  Code status: FULL F: none  E: replete as needed  N: Regular diet  VTE ppx: heparin sq  GI ppx: none  [facility]  Code status: [facility]

## 2024-05-03 NOTE — PROGRESS NOTE ADULT - SUBJECTIVE AND OBJECTIVE BOX
----- INTERVAL HPI/OVERNIGHT EVENTS -----     Patient was seen and examined at bedside while on HFNC. Reports feeling better. Breathing improved. No complaints at this time.Cre has improved to 1.94 from 2.22 this morning. Overnight, documented urine output has been 2.1L, with sbp of 130-140s, mostly ranging in 130s. Patient denies: N/V/D, difficulty urinating,  changes in bowel movements, LE edema.     ----- VITAL SIGNS -----  T(F): 99.5 (05-03-24 @ 05:00)  HR: 83 (05-03-24 @ 05:57)  BP: 136/79 (05-03-24 @ 04:02)  RR: 16 (05-03-24 @ 05:57)  SpO2: 98% (05-03-24 @ 05:57)  Wt(kg): --      05-02-24 @ 07:01  -  05-03-24 @ 07:00  --------------------------------------------------------  IN: 0 mL / OUT: 2150 mL / NET: -2150 mL        ----- Physical Exam -----   Constitutional: NAD, obese, on HFNC, nasal congestion   Neck: No JVD, subtle   Respiratory: bilateral wheezes and rhonchi,No rales   Cardiovascular: S1, S2, no murmurs  Gastrointestinal: distended, no guarding or rigidity   Extremities: No peripheral edema, pulses present       MEDICATIONS  (STANDING):  albuterol/ipratropium for Nebulization 3 milliLiter(s) Nebulizer every 6 hours  cefTRIAXone   IVPB 1000 milliGRAM(s) IV Intermittent every 24 hours  dextrose 10% Bolus 125 milliLiter(s) IV Bolus once  dextrose 5%. 1000 milliLiter(s) (100 mL/Hr) IV Continuous <Continuous>  dextrose 5%. 1000 milliLiter(s) (50 mL/Hr) IV Continuous <Continuous>  dextrose 50% Injectable 12.5 Gram(s) IV Push once  dextrose 50% Injectable 25 Gram(s) IV Push once  glucagon  Injectable 1 milliGRAM(s) IntraMuscular once  heparin   Injectable 7500 Unit(s) SubCutaneous every 8 hours  influenza   Vaccine 0.5 milliLiter(s) IntraMuscular once  insulin lispro (ADMELOG) corrective regimen sliding scale   SubCutaneous Before meals and at bedtime  methylPREDNISolone sodium succinate Injectable 40 milliGRAM(s) IV Push every 24 hours    MEDICATIONS  (PRN):  dextrose Oral Gel 15 Gram(s) Oral once PRN Blood Glucose LESS THAN 70 milliGRAM(s)/deciliter      Allergies    No Known Allergies    Intolerances        ----- LABS -----                         7.3    14.62 )-----------( 266      ( 03 May 2024 05:30 )             29.7     05-03    141  |  99  |  49<H>  ----------------------------<  97  4.5   |  33<H>  |  1.94<H>    Ca    8.8      03 May 2024 05:30  Phos  3.1     05-03  Mg     2.3     05-03    TPro  7.2  /  Alb  3.6  /  TBili  0.2  /  DBili  x   /  AST  14  /  ALT  5<L>  /  AlkPhos  78  05-03      Urinalysis Basic - ( 03 May 2024 05:30 )    Color: x / Appearance: x / SG: x / pH: x  Gluc: 97 mg/dL / Ketone: x  / Bili: x / Urobili: x   Blood: x / Protein: x / Nitrite: x   Leuk Esterase: x / RBC: x / WBC x   Sq Epi: x / Non Sq Epi: x / Bacteria: x          ----- RADIOLOGY & ADDITIONAL TESTS -----     Reviewed

## 2024-05-03 NOTE — PROGRESS NOTE ADULT - PROBLEM SELECTOR PLAN 3
S/p TTE with pulmonary hypertension present, pulmonary artery systolic pressure is 85 mmHg. S/p lasix 40 bid. Pulm consulted.   - Appreciate pulm recs - believe 2/2 pressure not volume  - Work up eosinophilia, connective tissue dx/autoimmune/vasculitis (MALENA, ANCA, anti-CCP, RF, IgE level, anti-SSA, anti-Scl70, anti-myomarker panel, CRP)  - Work up infectious causes (aspergillosis IgG, serum GM, strongyloides ab, stool for O&P, peripheral blood smear for parasites)

## 2024-05-03 NOTE — PROGRESS NOTE ADULT - ATTENDING COMMENTS
59y M with hx of COPD and HTN admitted for [facility] pneumonia. Hospital course c/b non oliguric IVANNA w/ peak SCr at 2.2.   Non oliguric IVANNA possibly related to residual effects of previous NSAID use/ diuretics / contrast  New BMP showing SCr at 1.94   Cystatin C levels at 2.25 w/ eGFR at 26%  Incidental finding of 4.3cm renal mass - Awaiting additional imaging studies for a clearer delineation of mass features  Maintain MAP > 65, avoid volume depletion   Avoid NSAIDs   Maintain strict I/o monitoring  Trend BMP for additional monitoring    Further recs as above  Discussed with primary team.

## 2024-05-04 DIAGNOSIS — N28.9 DISORDER OF KIDNEY AND URETER, UNSPECIFIED: ICD-10-CM

## 2024-05-04 LAB
ALBUMIN SERPL ELPH-MCNC: 3.6 G/DL — SIGNIFICANT CHANGE UP (ref 3.3–5)
ALP SERPL-CCNC: 68 U/L — SIGNIFICANT CHANGE UP (ref 40–120)
ALT FLD-CCNC: 6 U/L — LOW (ref 10–45)
ANION GAP SERPL CALC-SCNC: 7 MMOL/L — SIGNIFICANT CHANGE UP (ref 5–17)
AST SERPL-CCNC: 11 U/L — SIGNIFICANT CHANGE UP (ref 10–40)
BASOPHILS # BLD AUTO: 0.01 K/UL — SIGNIFICANT CHANGE UP (ref 0–0.2)
BASOPHILS NFR BLD AUTO: 0.1 % — SIGNIFICANT CHANGE UP (ref 0–2)
BILIRUB SERPL-MCNC: 0.2 MG/DL — SIGNIFICANT CHANGE UP (ref 0.2–1.2)
BUN SERPL-MCNC: 35 MG/DL — HIGH (ref 7–23)
CALCIUM SERPL-MCNC: 8.9 MG/DL — SIGNIFICANT CHANGE UP (ref 8.4–10.5)
CHLORIDE SERPL-SCNC: 99 MMOL/L — SIGNIFICANT CHANGE UP (ref 96–108)
CO2 SERPL-SCNC: 35 MMOL/L — HIGH (ref 22–31)
CREAT SERPL-MCNC: 1.27 MG/DL — SIGNIFICANT CHANGE UP (ref 0.5–1.3)
EGFR: 64 ML/MIN/1.73M2 — SIGNIFICANT CHANGE UP
EOSINOPHIL # BLD AUTO: 0 K/UL — SIGNIFICANT CHANGE UP (ref 0–0.5)
EOSINOPHIL NFR BLD AUTO: 0 % — SIGNIFICANT CHANGE UP (ref 0–6)
GLUCOSE BLDC GLUCOMTR-MCNC: 153 MG/DL — HIGH (ref 70–99)
GLUCOSE BLDC GLUCOMTR-MCNC: 177 MG/DL — HIGH (ref 70–99)
GLUCOSE BLDC GLUCOMTR-MCNC: 195 MG/DL — HIGH (ref 70–99)
GLUCOSE BLDC GLUCOMTR-MCNC: 97 MG/DL — SIGNIFICANT CHANGE UP (ref 70–99)
GLUCOSE SERPL-MCNC: 88 MG/DL — SIGNIFICANT CHANGE UP (ref 70–99)
HCT VFR BLD CALC: 30.2 % — LOW (ref 39–50)
HGB BLD-MCNC: 7.4 G/DL — LOW (ref 13–17)
IGE SERPL-ACNC: 1367 KU/L — HIGH
IMM GRANULOCYTES NFR BLD AUTO: 0.7 % — SIGNIFICANT CHANGE UP (ref 0–0.9)
LYMPHOCYTES # BLD AUTO: 1.81 K/UL — SIGNIFICANT CHANGE UP (ref 1–3.3)
LYMPHOCYTES # BLD AUTO: 14.8 % — SIGNIFICANT CHANGE UP (ref 13–44)
MAGNESIUM SERPL-MCNC: 2.3 MG/DL — SIGNIFICANT CHANGE UP (ref 1.6–2.6)
MCHC RBC-ENTMCNC: 16.2 PG — LOW (ref 27–34)
MCHC RBC-ENTMCNC: 24.5 GM/DL — LOW (ref 32–36)
MCV RBC AUTO: 66.1 FL — LOW (ref 80–100)
MONOCYTES # BLD AUTO: 1.27 K/UL — HIGH (ref 0–0.9)
MONOCYTES NFR BLD AUTO: 10.4 % — SIGNIFICANT CHANGE UP (ref 2–14)
NEUTROPHILS # BLD AUTO: 9.04 K/UL — HIGH (ref 1.8–7.4)
NEUTROPHILS NFR BLD AUTO: 74 % — SIGNIFICANT CHANGE UP (ref 43–77)
NRBC # BLD: 0 /100 WBCS — SIGNIFICANT CHANGE UP (ref 0–0)
PHOSPHATE SERPL-MCNC: 3.3 MG/DL — SIGNIFICANT CHANGE UP (ref 2.5–4.5)
PLATELET # BLD AUTO: 277 K/UL — SIGNIFICANT CHANGE UP (ref 150–400)
POTASSIUM SERPL-MCNC: 4.3 MMOL/L — SIGNIFICANT CHANGE UP (ref 3.5–5.3)
POTASSIUM SERPL-SCNC: 4.3 MMOL/L — SIGNIFICANT CHANGE UP (ref 3.5–5.3)
PROT SERPL-MCNC: 7.2 G/DL — SIGNIFICANT CHANGE UP (ref 6–8.3)
RBC # BLD: 4.57 M/UL — SIGNIFICANT CHANGE UP (ref 4.2–5.8)
RBC # FLD: 25.2 % — HIGH (ref 10.3–14.5)
SODIUM SERPL-SCNC: 141 MMOL/L — SIGNIFICANT CHANGE UP (ref 135–145)
WBC # BLD: 12.22 K/UL — HIGH (ref 3.8–10.5)
WBC # FLD AUTO: 12.22 K/UL — HIGH (ref 3.8–10.5)

## 2024-05-04 PROCEDURE — 99233 SBSQ HOSP IP/OBS HIGH 50: CPT | Mod: GC

## 2024-05-04 PROCEDURE — 99233 SBSQ HOSP IP/OBS HIGH 50: CPT

## 2024-05-04 PROCEDURE — 76770 US EXAM ABDO BACK WALL COMP: CPT | Mod: 26

## 2024-05-04 RX ORDER — FERROUS SULFATE 325(65) MG
325 TABLET ORAL
Refills: 0 | Status: DISCONTINUED | OUTPATIENT
Start: 2024-05-04 | End: 2024-05-04

## 2024-05-04 RX ORDER — IPRATROPIUM/ALBUTEROL SULFATE 18-103MCG
3 AEROSOL WITH ADAPTER (GRAM) INHALATION ONCE
Refills: 0 | Status: COMPLETED | OUTPATIENT
Start: 2024-05-04 | End: 2024-05-04

## 2024-05-04 RX ADMIN — Medication 3 MILLILITER(S): at 03:00

## 2024-05-04 RX ADMIN — HEPARIN SODIUM 7500 UNIT(S): 5000 INJECTION INTRAVENOUS; SUBCUTANEOUS at 16:40

## 2024-05-04 RX ADMIN — Medication 3 MILLILITER(S): at 09:56

## 2024-05-04 RX ADMIN — Medication 3 MILLILITER(S): at 21:34

## 2024-05-04 RX ADMIN — Medication 3 MILLILITER(S): at 16:38

## 2024-05-04 RX ADMIN — HEPARIN SODIUM 7500 UNIT(S): 5000 INJECTION INTRAVENOUS; SUBCUTANEOUS at 00:11

## 2024-05-04 RX ADMIN — Medication 1: at 16:45

## 2024-05-04 RX ADMIN — Medication 1: at 12:19

## 2024-05-04 RX ADMIN — Medication 40 MILLIGRAM(S): at 07:07

## 2024-05-04 RX ADMIN — Medication 1: at 22:31

## 2024-05-04 RX ADMIN — CEFTRIAXONE 100 MILLIGRAM(S): 500 INJECTION, POWDER, FOR SOLUTION INTRAMUSCULAR; INTRAVENOUS at 12:22

## 2024-05-04 RX ADMIN — HEPARIN SODIUM 7500 UNIT(S): 5000 INJECTION INTRAVENOUS; SUBCUTANEOUS at 07:06

## 2024-05-04 NOTE — PROGRESS NOTE ADULT - ASSESSMENT
59M, former smoker, PMHx Asthma (denies history of intubations), HTN, HLD, presented w SOB, found to have pneumonia on CXR, hypoxia & hypercapnia on VBG, started on BiPAP support & admitted for management of Asthma/COPD/OHS exacerbation secondary to CAP. 59M, former smoker, PMHx Asthma (denies history of intubations), HTN, HLD, presented w SOB, found to have pneumonia on CXR, hypoxia & hypercapnia on VBG, started on BiPAP support & admitted for management of Asthma/COPD/OHS exacerbation secondary to CAP. Now stepped down from [facility] to [facility]

## 2024-05-04 NOTE — PROGRESS NOTE ADULT - PROBLEM SELECTOR PLAN 5
Pt presents with hemoglobin 7.6. Unclear etiology. Iron studies showing iron deficiency.  - f/u FOBT  - Can give iron after resolution of infection Pt presents with hemoglobin 7.6. Unclear etiology. Iron studies c/w iron deficiency.  - f/u FOBT  - Can give iron after resolution of infection Pt presents with hemoglobin 7.6. Unclear etiology. Iron studies c/w iron deficiency.  - f/u FOBT  - Po iron started   - F/u outpatient for age appropriate screening

## 2024-05-04 NOTE — PROGRESS NOTE ADULT - PROBLEM SELECTOR PLAN 4
Cr 1.59 on arrival to ED, repeat 2.06 after 12h, sp lasix & contrast in ED. FeNa 1.9.   Cystatin c GFR 26  - Appreciate renal recs  - F/u renal US/bladder scan to r/o post-renal causes and urinary retention  - trend cr, follow up urine studies  - renally dose medications, avoid nephrotoxins  RESOLVED Cr 1.59 on arrival to ED, repeat 2.06 after 12h, sp lasix & contrast in ED. FeNa 1.9.   Cystatin c GFR 26  Renal U/S: No hydronephrosis. Simple appearing right renal cysts    - Appreciate renal recs  - trend cr, follow up urine studies  - renally dose medications, avoid nephrotoxins

## 2024-05-04 NOTE — PROGRESS NOTE ADULT - PROBLEM SELECTOR PLAN 8
F: none  E: replete as needed  N: Regular diet  VTE ppx: heparin sq  GI ppx: none  Dispo: [facility]  Code status: FULL

## 2024-05-04 NOTE — PROGRESS NOTE ADULT - SUBJECTIVE AND OBJECTIVE BOX
*************STEP DOWN FROM [facility] TO [facility]*****************    Hospital course:    59M PMH asthma, COPD? (no reported previous exacerbations), probably OHS presents with SOB while walking to work not responding to albuterol x3 prompting him to call 911. Admitted to  for COPD/asthma/OHS exacerbation 2/2 b/l PNA, managed with HFNC and nocturnal BIPAP, now weaned to RA, solumedrol exchanged for prednisone, now off steroids, and abx (completed 4 day course CTX and 3 day azithromycin). TTE with normal EF but c/w pHTN, pulm consulted and believe type 3 PH, rec'd for outpatient [facility]HC. C/c/b IVANNA, improving. Pt medically optimized for transfer to [facility].      SUBJECTIVE / INTERVAL HPI: Patient seen and examined at bedside.     VITAL SIGNS:  Vital Signs Last 24 Hrs  T(C): 36.7 (04 May 2024 17:20), Max: 37 (04 May 2024 09:30)  T(F): 98.1 (04 May 2024 17:20), Max: 98.6 (04 May 2024 09:30)  HR: 90 (04 May 2024 15:40) (68 - 94)  BP: 157/78 (04 May 2024 15:40) (144/80 - 157/78)  BP(mean): 111 (04 May 2024 15:40) (105 - 111)  RR: 16 (04 May 2024 15:40) (16 - 19)  SpO2: 94% (04 May 2024 15:40) (92% - 100%)    Parameters below as of 04 May 2024 15:40  Patient On (Oxygen Delivery Method): nasal cannula w/ humidification  O2 Flow (L/min): 2      PHYSICAL EXAM:    General: obese patient, alert, in no acute distress  HEENT: NC/AT; PERRL, anicteric sclera; MMM  Neck: supple  Cardiovascular: +S1/S2, RRR  Respiratory: expiratory wheezes, improving   Gastrointestinal: soft, NT/distended ; +BSx4  Extremities: WWP; no edema, clubbing or cyanosis  Vascular: 2+ radial, DP/PT pulses B/L  Neurological: AAOx3; no focal deficits    MEDICATIONS:  MEDICATIONS  (STANDING):  albuterol/ipratropium for Nebulization 3 milliLiter(s) Nebulizer every 6 hours  dextrose 10% Bolus 125 milliLiter(s) IV Bolus once  dextrose 5%. 1000 milliLiter(s) (100 mL/Hr) IV Continuous <Continuous>  dextrose 5%. 1000 milliLiter(s) (50 mL/Hr) IV Continuous <Continuous>  dextrose 50% Injectable 12.5 Gram(s) IV Push once  dextrose 50% Injectable 25 Gram(s) IV Push once  glucagon  Injectable 1 milliGRAM(s) IntraMuscular once  heparin   Injectable 7500 Unit(s) SubCutaneous every 8 hours  influenza   Vaccine 0.5 milliLiter(s) IntraMuscular once  insulin lispro (ADMELOG) corrective regimen sliding scale   SubCutaneous Before meals and at bedtime    MEDICATIONS  (PRN):  dextrose Oral Gel 15 Gram(s) Oral once PRN Blood Glucose LESS THAN 70 milliGRAM(s)/deciliter      ALLERGIES:  Allergies    No Known Allergies    Intolerances        LABS:                        7.4    12.22 )-----------( 277      ( 04 May 2024 05:30 )             30.2     05-04    141  |  99  |  35<H>  ----------------------------<  88  4.3   |  35<H>  |  1.27    Ca    8.9      04 May 2024 05:30  Phos  3.3     05-04  Mg     2.3     05-04    TPro  7.2  /  Alb  3.6  /  TBili  0.2  /  DBili  x   /  AST  11  /  ALT  6<L>  /  AlkPhos  68  05-04      Urinalysis Basic - ( 04 May 2024 05:30 )    Color: x / Appearance: x / SG: x / pH: x  Gluc: 88 mg/dL / Ketone: x  / Bili: x / Urobili: x   Blood: x / Protein: x / Nitrite: x   Leuk Esterase: x / RBC: x / WBC x   Sq Epi: x / Non Sq Epi: x / Bacteria: x      CAPILLARY BLOOD GLUCOSE      POCT Blood Glucose.: 195 mg/dL (04 May 2024 16:37)      RADIOLOGY & ADDITIONAL TESTS: Reviewed.   *************STEP DOWN FROM [facility] TO [facility]*****************    Hospital course:    59M PMH asthma, COPD? (no reported previous exacerbations) presents with SOB while walking to work not responding to albuterol x3 prompting him to call 911. Admitted to  for COPD/asthma/OHS exacerbation 2/2 b/l PNA, managed with HFNC and nocturnal BIPAP, now weaned to NC, solumedrol exchanged for prednisone, now off steroids, and abx (completed 4 day course CTX and 3 day azithromycin). TTE with normal EF but c/w pHTN, pulm consulted and believe type 3 PH, rec'd for outpatient RHC. C/c/b IVANNA, improving. Pt medically optimized for transfer to [facility].      SUBJECTIVE / INTERVAL HPI: Patient seen and examined at bedside. States he is feeling fine, endorsing some mild productive cough. Denies sob, chest pain, fever, chills, abd pain, n/v/d.     VITAL SIGNS:  Vital Signs Last 24 Hrs  T(C): 36.7 (04 May 2024 17:20), Max: 37 (04 May 2024 09:30)  T(F): 98.1 (04 May 2024 17:20), Max: 98.6 (04 May 2024 09:30)  HR: 90 (04 May 2024 15:40) (68 - 94)  BP: 157/78 (04 May 2024 15:40) (144/80 - 157/78)  BP(mean): 111 (04 May 2024 15:40) (105 - 111)  RR: 16 (04 May 2024 15:40) (16 - 19)  SpO2: 94% (04 May 2024 15:40) (92% - 100%)    Parameters below as of 04 May 2024 15:40  Patient On (Oxygen Delivery Method): nasal cannula w/ humidification  O2 Flow (L/min): 2      PHYSICAL EXAM:    General: obese patient, alert, in no acute distress  HEENT: NC/AT; PERRL, anicteric sclera; MMM  Neck: supple  Cardiovascular: +S1/S2, RRR  Respiratory: mild expiratory wheezes, crackles in the lower lobe L>R  Gastrointestinal: soft, NT/distended ; +BSx4  Extremities: WWP; no edema, clubbing or cyanosis  Vascular: 2+ radial, DP/PT pulses B/L  Neurological: AAOx3; no focal deficits    MEDICATIONS:  MEDICATIONS  (STANDING):  albuterol/ipratropium for Nebulization 3 milliLiter(s) Nebulizer every 6 hours  dextrose 10% Bolus 125 milliLiter(s) IV Bolus once  dextrose 5%. 1000 milliLiter(s) (100 mL/Hr) IV Continuous <Continuous>  dextrose 5%. 1000 milliLiter(s) (50 mL/Hr) IV Continuous <Continuous>  dextrose 50% Injectable 12.5 Gram(s) IV Push once  dextrose 50% Injectable 25 Gram(s) IV Push once  glucagon  Injectable 1 milliGRAM(s) IntraMuscular once  heparin   Injectable 7500 Unit(s) SubCutaneous every 8 hours  influenza   Vaccine 0.5 milliLiter(s) IntraMuscular once  insulin lispro (ADMELOG) corrective regimen sliding scale   SubCutaneous Before meals and at bedtime    MEDICATIONS  (PRN):  dextrose Oral Gel 15 Gram(s) Oral once PRN Blood Glucose LESS THAN 70 milliGRAM(s)/deciliter      ALLERGIES:  Allergies    No Known Allergies    Intolerances        LABS:                        7.4    12.22 )-----------( 277      ( 04 May 2024 05:30 )             30.2     05-04    141  |  99  |  35<H>  ----------------------------<  88  4.3   |  35<H>  |  1.27    Ca    8.9      04 May 2024 05:30  Phos  3.3     05-04  Mg     2.3     05-04    TPro  7.2  /  Alb  3.6  /  TBili  0.2  /  DBili  x   /  AST  11  /  ALT  6<L>  /  AlkPhos  68  05-04      Urinalysis Basic - ( 04 May 2024 05:30 )    Color: x / Appearance: x / SG: x / pH: x  Gluc: 88 mg/dL / Ketone: x  / Bili: x / Urobili: x   Blood: x / Protein: x / Nitrite: x   Leuk Esterase: x / RBC: x / WBC x   Sq Epi: x / Non Sq Epi: x / Bacteria: x      CAPILLARY BLOOD GLUCOSE      POCT Blood Glucose.: 195 mg/dL (04 May 2024 16:37)      RADIOLOGY & ADDITIONAL TESTS: Reviewed.

## 2024-05-04 NOTE — PROGRESS NOTE ADULT - SUBJECTIVE AND OBJECTIVE BOX
***TRANSFER  TO [facility]***    Hospital course: 59M PMH asthma, COPD? (no reported previous exacerbations), probably OHS presents with SOB while walking to work not responding to albuterol x3 prompting him to call 911. Admitted to  for COPD/asthma/OHS exacerbation 2/2 b/l PNA, managed with HFNC and nocturnal BIPAP, now weaned to RA, solumedrol exchanged for prednisone, now off steroids, and abx (completed 4 day course CTX and 3 day azithromycin). TTE with normal EF but c/w pHTN, pulm consulted and believe type 3 PH, rec'd for outpatient RHC. C/c/b IVANNA, improving. Pt medically optimized for transfer to [facility].      OVERNIGHT EVENTS: BIPAP    SUBJECTIVE / INTERVAL HPI: Patient seen and examined at bedside. Patient found laying in bed comfortably, wearing NC. He says his breathing is much better. Has no acute complaints. ROS negative.     VITAL SIGNS:  Vital Signs Last 24 Hrs  T(C): 36.8 (04 May 2024 14:00), Max: 37 (04 May 2024 09:30)  T(F): 98.3 (04 May 2024 14:00), Max: 98.6 (04 May 2024 09:30)  HR: 92 (04 May 2024 13:34) (68 - 106)  BP: 152/75 (04 May 2024 12:30) (143/80 - 154/73)  BP(mean): 107 (04 May 2024 12:30) (102 - 109)  RR: 17 (04 May 2024 13:34) (16 - 19)  SpO2: 94% (04 May 2024 13:34) (92% - 100%)    Parameters below as of 04 May 2024 13:34  Patient On (Oxygen Delivery Method): nasal cannula w/ humidification  O2 Flow (L/min): 2      PHYSICAL EXAM:    General: obese patient, alert, in no acute distress  HEENT: NC/AT; PERRL, anicteric sclera; MMM  Neck: supple  Cardiovascular: +S1/S2, RRR  Respiratory: expiratory wheezes, improving   Gastrointestinal: soft, NT/distended ; +BSx4  Extremities: WWP; no edema, clubbing or cyanosis  Vascular: 2+ radial, DP/PT pulses B/L  Neurological: AAOx3; no focal deficits    MEDICATIONS:  MEDICATIONS  (STANDING):  albuterol/ipratropium for Nebulization 3 milliLiter(s) Nebulizer every 6 hours  dextrose 10% Bolus 125 milliLiter(s) IV Bolus once  dextrose 5%. 1000 milliLiter(s) (50 mL/Hr) IV Continuous <Continuous>  dextrose 5%. 1000 milliLiter(s) (100 mL/Hr) IV Continuous <Continuous>  dextrose 50% Injectable 25 Gram(s) IV Push once  dextrose 50% Injectable 12.5 Gram(s) IV Push once  glucagon  Injectable 1 milliGRAM(s) IntraMuscular once  heparin   Injectable 7500 Unit(s) SubCutaneous every 8 hours  influenza   Vaccine 0.5 milliLiter(s) IntraMuscular once  insulin lispro (ADMELOG) corrective regimen sliding scale   SubCutaneous Before meals and at bedtime    MEDICATIONS  (PRN):  dextrose Oral Gel 15 Gram(s) Oral once PRN Blood Glucose LESS THAN 70 milliGRAM(s)/deciliter      ALLERGIES:  Allergies    No Known Allergies    Intolerances        LABS:                        7.4    12.22 )-----------( 277      ( 04 May 2024 05:30 )             30.2     05-04    141  |  99  |  35<H>  ----------------------------<  88  4.3   |  35<H>  |  1.27    Ca    8.9      04 May 2024 05:30  Phos  3.3     05-04  Mg     2.3     05-04    TPro  7.2  /  Alb  3.6  /  TBili  0.2  /  DBili  x   /  AST  11  /  ALT  6<L>  /  AlkPhos  68  05-04      Urinalysis Basic - ( 04 May 2024 05:30 )    Color: x / Appearance: x / SG: x / pH: x  Gluc: 88 mg/dL / Ketone: x  / Bili: x / Urobili: x   Blood: x / Protein: x / Nitrite: x   Leuk Esterase: x / RBC: x / WBC x   Sq Epi: x / Non Sq Epi: x / Bacteria: x      CAPILLARY BLOOD GLUCOSE      POCT Blood Glucose.: 153 mg/dL (04 May 2024 11:37)      RADIOLOGY & ADDITIONAL TESTS: Reviewed.

## 2024-05-04 NOTE — PROGRESS NOTE ADULT - PROBLEM SELECTOR PLAN 9
F: none  E: replete as needed  N: Regular diet  VTE ppx: heparin sq  GI ppx: none  Dispo: [facility]  Code status: FULL

## 2024-05-04 NOTE — PROGRESS NOTE ADULT - ASSESSMENT
59M, former smoker, PMHx Asthma (denies history of intubations), HTN, HLD, presented w SOB, found to have pneumonia on CXR, hypoxia & hypercapnia on VBG, started on BiPAP support & admitted for management of Asthma/COPD/OHS exacerbation secondary to CAP.

## 2024-05-04 NOTE — PROGRESS NOTE ADULT - PROBLEM SELECTOR PLAN 2
- CXR with BL PNA, leukocytosis.   -s/p CTX and azithromycin  -BCX NGTD - CXR with BL PNA, leukocytosis.   - s/p CTX and azithromycin  - BCX NGTD S/p TTE with pulmonary hypertension present, pulmonary artery systolic pressure is 85 mmHg. S/p lasix 40 bid. Pulm consulted.   -Likely multifactorial, high suspicion for group III disease (obesity, some signs/sx of BLANCA, and known smoking hx with likely significant airway disease), underlying hypoxia and hypercapnia will worsen PH as well as acute infection.  - Appreciate pulm recs;  Follow up with outpatient pulmonology.   - Initially with eosinophilia, now resolved.

## 2024-05-04 NOTE — PROGRESS NOTE ADULT - PROBLEM SELECTOR PLAN 1
AHRF w hypercapnia, spo2 76% on presentation 2/2 asthma/COPD exacerbation vs OHS. CXR w bilateral pna, leukocytosis (WBC 11.99) currently on bipap support  repeat abg w ph 7.3, pco2 68, po2 145, hco3 34  RVP (-)   Legionella (-)  TTE with normal EF but with PH  HFNC weaned to NC  - dc'd prednisone   - duonebs q6h AHRF w hypercapnia, spo2 76% on presentation 2/2 asthma/COPD exacerbation vs OHS. CXR w bilateral pna, leukocytosis (WBC 11.99) currently on bipap support  repeat abg w ph 7.3, pco2 68, po2 145, hco3 34  RVP (-)   Legionella (-)  TTE with normal EF but with PH  HFNC weaned to 2L NC  - Wean O2 as tolerated  - dc'd prednisone   - duonebs q6h

## 2024-05-04 NOTE — PROGRESS NOTE ADULT - ATTENDING COMMENTS
Decompensated OHS/BLANCA (undiagnosed), with acute on chronic hypercarbic respiratory failure, less likely asthma/COPD with PH. Should be on NIV at night and on discharge. Stop steroids and bronchodilators. Needs outpt pulm follow up. Rest of plan as per above. Transfer to [facility].

## 2024-05-04 NOTE — PROGRESS NOTE ADULT - ATTENDING COMMENTS
60 yo M with PMHx asthma/COPD, suspected OHS BIBEMS 4/30 with several day hx of cough and shortness of breath admitted to [facility]/telemetry for acute asthma exacerbation 2/2 community-acquired PNA requiring BiPAP with hospital course c/b non-oliguric IVANNA 2/2 NSAID/diuretic use, now weaned off BiPAP to 2L NC and stable for stepdown to [facility] for ongoing management.      At time of stepdown, VSS. Satting well on 2L NC at rest. Labs reviewed. Creatinine improving to 1.27 from 1.94 on most recent labs. Now off abx. Overall clinically improving.      [ ] Wean O2 as tolerated (goal >88%)   [ ] Monitor off abx (s/p CTX/Azithromycin)   [ ] Duonebs Q6 standing   [ ] BiPAP Qhs    [ ] 4.3 cm R renal lesion -> Outpatient CT/MRI on DC   [ ] Iron-deficiency anemia -> PO Iron on DC + Outpatient F/U for colonoscopy 60 yo M with PMHx asthma/COPD, suspected OHS BIBEMS 4/30 with several day hx of cough and shortness of breath admitted to [facility]/telemetry for acute asthma exacerbation 2/2 community-acquired PNA requiring BiPAP with hospital course c/b non-oliguric IVANNA 2/2 NSAID/diuretic use, now weaned off BiPAP to 2L NC and stable for stepdown to [facility] for ongoing management.      At time of stepdown, VSS. Satting well on 2L NC at rest. Labs reviewed. Creatinine improving to 1.27 from 1.94 on most recent labs. Now off abx. Overall clinically improving. On exam ~9PM, still with mild wheezing, satting well on NC without increased WOB/accessory muscle use.     [ ] Wean O2 as tolerated (goal >88%)   [ ] Monitor off abx (s/p CTX/Azithromycin)   [ ] Duonebs Q6 standing   [ ] BiPAP Qhs    [ ] 4.3 cm R renal lesion -> Outpatient CT/MRI on DC   [ ] Iron-deficiency anemia -> PO Iron on DC + Outpatient F/U for colonoscopy

## 2024-05-04 NOTE — PROGRESS NOTE ADULT - PROBLEM SELECTOR PLAN 1
AHRF w hypercapnia, spo2 76% on presentation 2/2 asthma/COPD exacerbation. CXR w bilateral pna, leukocytosis (WBC 11.99) currently on bipap support  repeat abg w ph 7.3, pco2 68, po2 145, hco3 34  RVP (-)   Legionella (-)  TTE with normal EF but with PH  HFNC weaned to NC  - dc'd prednisone   - duonebs q6h AHRF w hypercapnia, spo2 76% on presentation 2/2 asthma/COPD exacerbation vs OHS. CXR w bilateral pna, leukocytosis (WBC 11.99) currently on bipap support  repeat abg w ph 7.3, pco2 68, po2 145, hco3 34  RVP (-)   Legionella (-)  TTE with normal EF but with PH  HFNC weaned to NC  - dc'd prednisone   - duonebs q6h

## 2024-05-04 NOTE — PROGRESS NOTE ADULT - PROBLEM SELECTOR PLAN 8
F: none  E: replete as needed  N: Regular diet  VTE ppx: heparin sq  GI ppx: none  Dispo: [facility]  Code status: FULL F: none  E: replete as needed  N: Regular diet  VTE ppx: heparin sq  GI ppx: none  Dispo: [facility]  Code status: FULL CT showing Indeterminate 4.3 cm right renal lesion, which can be further evaluated with CT renal mass protocol or MRI.  -Follow up outpatient for further imaging.

## 2024-05-04 NOTE — PROGRESS NOTE ADULT - PROBLEM SELECTOR PLAN 3
S/p TTE with pulmonary hypertension present, pulmonary artery systolic pressure is 85 mmHg. S/p lasix 40 bid. Pulm consulted.   - Appreciate pulm recs - believe 2/2 pressure not volume  - Work up eosinophilia, connective tissue dx/autoimmune/vasculitis (MALENA, ANCA, anti-CCP, RF, IgE level, anti-SSA, anti-Scl70, anti-myomarker panel, CRP)  - Work up infectious causes (aspergillosis IgG, serum GM, strongyloides ab, stool for O&P, peripheral blood smear for parasites) S/p TTE with pulmonary hypertension present, pulmonary artery systolic pressure is 85 mmHg. S/p lasix 40 bid. Pulm consulted.   -Likely multifactorial, high suspicion for group III disease (obesity, some signs/sx of BLANCA, and known smoking hx with likely significant airway disease), underlying hypoxia and hypercapnia will worsen PH as well as acute infection.  - Appreciate pulm recs;   - Work up eosinophilia, connective tissue dx/autoimmune/vasculitis (MALENA, ANCA, anti-CCP, RF, IgE level, anti-SSA, anti-Scl70, anti-myomarker panel, CRP)  - Work up infectious causes (aspergillosis IgG, serum GM, strongyloides ab, stool for O&P, peripheral blood smear for parasites) - CT chest showed bilateral lower lobe pneumonia in the setting of bronchial impaction.  - CXR with BL PNA, leukocytosis on admission.   - s/p CTX and azithromycin  - BCX NGTD

## 2024-05-05 LAB
ANION GAP SERPL CALC-SCNC: 7 MMOL/L — SIGNIFICANT CHANGE UP (ref 5–17)
BLD GP AB SCN SERPL QL: NEGATIVE — SIGNIFICANT CHANGE UP
BUN SERPL-MCNC: 27 MG/DL — HIGH (ref 7–23)
CALCIUM SERPL-MCNC: 9 MG/DL — SIGNIFICANT CHANGE UP (ref 8.4–10.5)
CHLORIDE SERPL-SCNC: 98 MMOL/L — SIGNIFICANT CHANGE UP (ref 96–108)
CO2 SERPL-SCNC: 36 MMOL/L — HIGH (ref 22–31)
CREAT SERPL-MCNC: 1.23 MG/DL — SIGNIFICANT CHANGE UP (ref 0.5–1.3)
EGFR: 67 ML/MIN/1.73M2 — SIGNIFICANT CHANGE UP
GLUCOSE BLDC GLUCOMTR-MCNC: 108 MG/DL — HIGH (ref 70–99)
GLUCOSE BLDC GLUCOMTR-MCNC: 118 MG/DL — HIGH (ref 70–99)
GLUCOSE BLDC GLUCOMTR-MCNC: 146 MG/DL — HIGH (ref 70–99)
GLUCOSE BLDC GLUCOMTR-MCNC: 169 MG/DL — HIGH (ref 70–99)
GLUCOSE SERPL-MCNC: 77 MG/DL — SIGNIFICANT CHANGE UP (ref 70–99)
HCT VFR BLD CALC: 31 % — LOW (ref 39–50)
HGB BLD-MCNC: 7.5 G/DL — LOW (ref 13–17)
MCHC RBC-ENTMCNC: 15.9 PG — LOW (ref 27–34)
MCHC RBC-ENTMCNC: 24.2 GM/DL — LOW (ref 32–36)
MCV RBC AUTO: 65.8 FL — LOW (ref 80–100)
NRBC # BLD: 0 /100 WBCS — SIGNIFICANT CHANGE UP (ref 0–0)
PLATELET # BLD AUTO: 302 K/UL — SIGNIFICANT CHANGE UP (ref 150–400)
POTASSIUM SERPL-MCNC: 4.5 MMOL/L — SIGNIFICANT CHANGE UP (ref 3.5–5.3)
POTASSIUM SERPL-SCNC: 4.5 MMOL/L — SIGNIFICANT CHANGE UP (ref 3.5–5.3)
RBC # BLD: 4.71 M/UL — SIGNIFICANT CHANGE UP (ref 4.2–5.8)
RBC # FLD: 25.3 % — HIGH (ref 10.3–14.5)
RH IG SCN BLD-IMP: POSITIVE — SIGNIFICANT CHANGE UP
SODIUM SERPL-SCNC: 141 MMOL/L — SIGNIFICANT CHANGE UP (ref 135–145)
WBC # BLD: 9.8 K/UL — SIGNIFICANT CHANGE UP (ref 3.8–10.5)
WBC # FLD AUTO: 9.8 K/UL — SIGNIFICANT CHANGE UP (ref 3.8–10.5)

## 2024-05-05 PROCEDURE — 99232 SBSQ HOSP IP/OBS MODERATE 35: CPT | Mod: GC

## 2024-05-05 RX ORDER — LIDOCAINE 4 G/100G
2 CREAM TOPICAL DAILY
Refills: 0 | Status: DISCONTINUED | OUTPATIENT
Start: 2024-05-05 | End: 2024-05-07

## 2024-05-05 RX ADMIN — Medication 3 MILLILITER(S): at 03:32

## 2024-05-05 RX ADMIN — Medication 3 MILLILITER(S): at 13:22

## 2024-05-05 RX ADMIN — Medication 1: at 22:04

## 2024-05-05 RX ADMIN — Medication 3 MILLILITER(S): at 21:45

## 2024-05-05 RX ADMIN — HEPARIN SODIUM 7500 UNIT(S): 5000 INJECTION INTRAVENOUS; SUBCUTANEOUS at 23:22

## 2024-05-05 NOTE — PROGRESS NOTE ADULT - SUBJECTIVE AND OBJECTIVE BOX
Pt seen and examined by me at bedside   chart reviewed  states breathing better.     VSS, on 2LNC  NAD, obese male  +S1/S2   fair air entery, +scattered wheezing    labs reviewed     a/p:  60 yo M with PMHx asthma/COPD, suspected OHS BIBEMS 4/30 with several day hx of cough and shortness of breath admitted to [facility]/telemetry for acute asthma exacerbation 2/2 community-acquired PNA requiring BiPAP with hospital course c/b non-oliguric IVANNA 2/2 NSAID/diuretic use, now weaned off BiPAP to 2L NC and stable for stepdown to [facility] for ongoing management.     #acute hypoxic RF 2/2 acute asthma exacerbation  #CAP  #morbid obesity BMI>40  #ivanna resolved    -completed abx course  -taper off NC as tolerated  -c/w nebs tx  -DVT ppx: HSQ  - incidental 4.3 cm R renal lesion -> Outpatient CT/MRI on DC   - Iron-deficiency anemia -> PO Iron on DC + Outpatient F/U for colonoscopy.

## 2024-05-06 LAB
ALBUMIN SERPL ELPH-MCNC: 3.6 G/DL — SIGNIFICANT CHANGE UP (ref 3.3–5)
ALP SERPL-CCNC: 68 U/L — SIGNIFICANT CHANGE UP (ref 40–120)
ALT FLD-CCNC: 9 U/L — LOW (ref 10–45)
ANA TITR SER: NEGATIVE — SIGNIFICANT CHANGE UP
ANION GAP SERPL CALC-SCNC: 7 MMOL/L — SIGNIFICANT CHANGE UP (ref 5–17)
ANISOCYTOSIS BLD QL: SIGNIFICANT CHANGE UP
AST SERPL-CCNC: 14 U/L — SIGNIFICANT CHANGE UP (ref 10–40)
AUTO DIFF PNL BLD: NEGATIVE — SIGNIFICANT CHANGE UP
BASE EXCESS BLDA CALC-SCNC: 10.1 MMOL/L — HIGH (ref -2–3)
BASOPHILS # BLD AUTO: 0.07 K/UL — SIGNIFICANT CHANGE UP (ref 0–0.2)
BASOPHILS NFR BLD AUTO: 0.9 % — SIGNIFICANT CHANGE UP (ref 0–2)
BILIRUB SERPL-MCNC: 0.3 MG/DL — SIGNIFICANT CHANGE UP (ref 0.2–1.2)
BUN SERPL-MCNC: 23 MG/DL — SIGNIFICANT CHANGE UP (ref 7–23)
C-ANCA SER-ACNC: NEGATIVE — SIGNIFICANT CHANGE UP
CALCIUM SERPL-MCNC: 9 MG/DL — SIGNIFICANT CHANGE UP (ref 8.4–10.5)
CHLORIDE SERPL-SCNC: 101 MMOL/L — SIGNIFICANT CHANGE UP (ref 96–108)
CO2 BLDA-SCNC: 37 MMOL/L — HIGH (ref 19–24)
CO2 SERPL-SCNC: 34 MMOL/L — HIGH (ref 22–31)
CREAT SERPL-MCNC: 1.2 MG/DL — SIGNIFICANT CHANGE UP (ref 0.5–1.3)
CULTURE RESULTS: SIGNIFICANT CHANGE UP
EGFR: 69 ML/MIN/1.73M2 — SIGNIFICANT CHANGE UP
ELLIPTOCYTES BLD QL SMEAR: SLIGHT — SIGNIFICANT CHANGE UP
EOSINOPHIL # BLD AUTO: 0.07 K/UL — SIGNIFICANT CHANGE UP (ref 0–0.5)
EOSINOPHIL NFR BLD AUTO: 0.9 % — SIGNIFICANT CHANGE UP (ref 0–6)
GLUCOSE BLDC GLUCOMTR-MCNC: 116 MG/DL — HIGH (ref 70–99)
GLUCOSE BLDC GLUCOMTR-MCNC: 121 MG/DL — HIGH (ref 70–99)
GLUCOSE BLDC GLUCOMTR-MCNC: 163 MG/DL — HIGH (ref 70–99)
GLUCOSE BLDC GLUCOMTR-MCNC: 96 MG/DL — SIGNIFICANT CHANGE UP (ref 70–99)
GLUCOSE SERPL-MCNC: 93 MG/DL — SIGNIFICANT CHANGE UP (ref 70–99)
HCO3 BLDA-SCNC: 36 MMOL/L — HIGH (ref 21–28)
HCT VFR BLD CALC: 31 % — LOW (ref 39–50)
HGB BLD-MCNC: 7.4 G/DL — LOW (ref 13–17)
HYPOCHROMIA BLD QL: SIGNIFICANT CHANGE UP
LYMPHOCYTES # BLD AUTO: 1.47 K/UL — SIGNIFICANT CHANGE UP (ref 1–3.3)
LYMPHOCYTES # BLD AUTO: 18.2 % — SIGNIFICANT CHANGE UP (ref 13–44)
MAGNESIUM SERPL-MCNC: 2 MG/DL — SIGNIFICANT CHANGE UP (ref 1.6–2.6)
MANUAL SMEAR VERIFICATION: SIGNIFICANT CHANGE UP
MCHC RBC-ENTMCNC: 15.8 PG — LOW (ref 27–34)
MCHC RBC-ENTMCNC: 23.9 GM/DL — LOW (ref 32–36)
MCV RBC AUTO: 66.4 FL — LOW (ref 80–100)
MICROCYTES BLD QL: SIGNIFICANT CHANGE UP
MONOCYTES # BLD AUTO: 0.63 K/UL — SIGNIFICANT CHANGE UP (ref 0–0.9)
MONOCYTES NFR BLD AUTO: 7.8 % — SIGNIFICANT CHANGE UP (ref 2–14)
MPO AB + PR3 PNL SER: SIGNIFICANT CHANGE UP
NEUTROPHILS # BLD AUTO: 5.63 K/UL — SIGNIFICANT CHANGE UP (ref 1.8–7.4)
NEUTROPHILS NFR BLD AUTO: 69.6 % — SIGNIFICANT CHANGE UP (ref 43–77)
P-ANCA SER-ACNC: NEGATIVE — SIGNIFICANT CHANGE UP
PCO2 BLDA: 50 MMHG — HIGH (ref 35–48)
PH BLDA: 7.46 — HIGH (ref 7.35–7.45)
PHOSPHATE SERPL-MCNC: 4.3 MG/DL — SIGNIFICANT CHANGE UP (ref 2.5–4.5)
PLAT MORPH BLD: NORMAL — SIGNIFICANT CHANGE UP
PLATELET # BLD AUTO: 300 K/UL — SIGNIFICANT CHANGE UP (ref 150–400)
PO2 BLDA: 67 MMHG — LOW (ref 83–108)
POIKILOCYTOSIS BLD QL AUTO: SLIGHT — SIGNIFICANT CHANGE UP
POLYCHROMASIA BLD QL SMEAR: SLIGHT — SIGNIFICANT CHANGE UP
POTASSIUM SERPL-MCNC: 4.4 MMOL/L — SIGNIFICANT CHANGE UP (ref 3.5–5.3)
POTASSIUM SERPL-SCNC: 4.4 MMOL/L — SIGNIFICANT CHANGE UP (ref 3.5–5.3)
PROT SERPL-MCNC: 7 G/DL — SIGNIFICANT CHANGE UP (ref 6–8.3)
RBC # BLD: 4.67 M/UL — SIGNIFICANT CHANGE UP (ref 4.2–5.8)
RBC # FLD: 25.5 % — HIGH (ref 10.3–14.5)
RBC BLD AUTO: ABNORMAL
SAO2 % BLDA: 96.2 % — SIGNIFICANT CHANGE UP (ref 94–98)
SCHISTOCYTES BLD QL AUTO: SLIGHT — SIGNIFICANT CHANGE UP
SODIUM SERPL-SCNC: 142 MMOL/L — SIGNIFICANT CHANGE UP (ref 135–145)
SPECIMEN SOURCE: SIGNIFICANT CHANGE UP
SSA-52 (RO52) (ENA) ANTIBODY, IGG: 7 AU/ML — SIGNIFICANT CHANGE UP (ref 0–40)
SSA-60 (RO60) (ENA) ANTIBODY, IGG: 38 AU/ML — SIGNIFICANT CHANGE UP (ref 0–40)
STRONGYLOIDES AB SER-ACNC: NEGATIVE — SIGNIFICANT CHANGE UP
VARIANT LYMPHS # BLD: 2.6 % — SIGNIFICANT CHANGE UP (ref 0–6)
WBC # BLD: 8.09 K/UL — SIGNIFICANT CHANGE UP (ref 3.8–10.5)
WBC # FLD AUTO: 8.09 K/UL — SIGNIFICANT CHANGE UP (ref 3.8–10.5)

## 2024-05-06 PROCEDURE — 99233 SBSQ HOSP IP/OBS HIGH 50: CPT | Mod: GC

## 2024-05-06 PROCEDURE — 99231 SBSQ HOSP IP/OBS SF/LOW 25: CPT

## 2024-05-06 RX ORDER — LOSARTAN/HYDROCHLOROTHIAZIDE 100MG-25MG
1 TABLET ORAL
Refills: 0 | DISCHARGE

## 2024-05-06 RX ORDER — METOPROLOL TARTRATE 50 MG
1 TABLET ORAL
Refills: 0 | DISCHARGE

## 2024-05-06 RX ORDER — TIOTROPIUM BROMIDE AND OLODATEROL 3.124; 2.736 UG/1; UG/1
2 SPRAY, METERED RESPIRATORY (INHALATION) DAILY
Refills: 0 | Status: DISCONTINUED | OUTPATIENT
Start: 2024-05-06 | End: 2024-05-07

## 2024-05-06 RX ORDER — ATORVASTATIN CALCIUM 80 MG/1
1 TABLET, FILM COATED ORAL
Refills: 0 | DISCHARGE

## 2024-05-06 RX ORDER — FLUTICASONE FUROATE, UMECLIDINIUM BROMIDE AND VILANTEROL TRIFENATATE 200; 62.5; 25 UG/1; UG/1; UG/1
1 POWDER RESPIRATORY (INHALATION)
Qty: 1 | Refills: 2
Start: 2024-05-06 | End: 2024-08-03

## 2024-05-06 RX ORDER — ALBUTEROL 90 UG/1
2 AEROSOL, METERED ORAL
Refills: 0 | DISCHARGE

## 2024-05-06 RX ORDER — AMLODIPINE BESYLATE 2.5 MG/1
1 TABLET ORAL
Refills: 0 | DISCHARGE

## 2024-05-06 RX ORDER — IRON SUCROSE 20 MG/ML
300 INJECTION, SOLUTION INTRAVENOUS EVERY 24 HOURS
Refills: 0 | Status: DISCONTINUED | OUTPATIENT
Start: 2024-05-06 | End: 2024-05-07

## 2024-05-06 RX ORDER — BUDESONIDE AND FORMOTEROL FUMARATE DIHYDRATE 160; 4.5 UG/1; UG/1
2 AEROSOL RESPIRATORY (INHALATION)
Refills: 0 | Status: DISCONTINUED | OUTPATIENT
Start: 2024-05-06 | End: 2024-05-07

## 2024-05-06 RX ORDER — ATORVASTATIN CALCIUM 80 MG/1
10 TABLET, FILM COATED ORAL AT BEDTIME
Refills: 0 | Status: DISCONTINUED | OUTPATIENT
Start: 2024-05-06 | End: 2024-05-07

## 2024-05-06 RX ORDER — ALBUTEROL 90 UG/1
2 AEROSOL, METERED ORAL EVERY 12 HOURS
Refills: 0 | Status: DISCONTINUED | OUTPATIENT
Start: 2024-05-06 | End: 2024-05-07

## 2024-05-06 RX ORDER — AMLODIPINE BESYLATE 2.5 MG/1
10 TABLET ORAL EVERY 24 HOURS
Refills: 0 | Status: DISCONTINUED | OUTPATIENT
Start: 2024-05-06 | End: 2024-05-07

## 2024-05-06 RX ORDER — FERROUS SULFATE 325(65) MG
1 TABLET ORAL
Qty: 15 | Refills: 2
Start: 2024-05-06 | End: 2024-08-03

## 2024-05-06 RX ADMIN — LIDOCAINE 2 PATCH: 4 CREAM TOPICAL at 18:39

## 2024-05-06 RX ADMIN — IRON SUCROSE 176.67 MILLIGRAM(S): 20 INJECTION, SOLUTION INTRAVENOUS at 14:31

## 2024-05-06 RX ADMIN — HEPARIN SODIUM 7500 UNIT(S): 5000 INJECTION INTRAVENOUS; SUBCUTANEOUS at 21:43

## 2024-05-06 RX ADMIN — AMLODIPINE BESYLATE 10 MILLIGRAM(S): 2.5 TABLET ORAL at 16:54

## 2024-05-06 RX ADMIN — ATORVASTATIN CALCIUM 10 MILLIGRAM(S): 80 TABLET, FILM COATED ORAL at 21:43

## 2024-05-06 RX ADMIN — Medication 1: at 22:31

## 2024-05-06 RX ADMIN — Medication 3 MILLILITER(S): at 03:24

## 2024-05-06 RX ADMIN — BUDESONIDE AND FORMOTEROL FUMARATE DIHYDRATE 2 PUFF(S): 160; 4.5 AEROSOL RESPIRATORY (INHALATION) at 16:53

## 2024-05-06 RX ADMIN — HEPARIN SODIUM 7500 UNIT(S): 5000 INJECTION INTRAVENOUS; SUBCUTANEOUS at 07:05

## 2024-05-06 RX ADMIN — LIDOCAINE 2 PATCH: 4 CREAM TOPICAL at 16:54

## 2024-05-06 NOTE — PROGRESS NOTE ADULT - ATTENDING COMMENTS
59-year-old male with a PMHx of asthma/COPD and suspected OHS who presented with acute hypoxic/hypercapneic respiratory failure 2/2 acute asthma exacerbation 2/2 CAP requiring NIPPV for which he was initially admitted to telemetry, now stable on RMF.      Plan:    -resume home inhalers, continue with PRN duonebs, s/p steroid and Abx course   -successfully weaned oxygen, ambulatory sat on RA 91-92%  -CM/SW consulted for BiPAP upon discharge   -outpatient pulmonology follow up   -continue with iron supplementation, needs outpatient follow up for colonoscopy    -IVANNA resolved, needs outpatient nephrology follow up     Rest of plan as per resident note.

## 2024-05-06 NOTE — PROGRESS NOTE ADULT - PROBLEM SELECTOR PLAN 2
S/p TTE with pulmonary hypertension present, pulmonary artery systolic pressure is 85 mmHg. S/p lasix 40 bid. Pulm consulted.   -Likely multifactorial, high suspicion for group III disease (obesity, some signs/sx of BLANCA, and known smoking hx with likely significant airway disease), underlying hypoxia and hypercapnia will worsen PH as well as acute infection.  - Appreciate pulm recs;  Follow up with outpatient pulmonology for possible RHC  - Initially with eosinophilia, now resolved.

## 2024-05-06 NOTE — PROGRESS NOTE ADULT - PROBLEM SELECTOR PLAN 8
F: none  E: replete as needed  N: Regular diet  VTE ppx: heparin sq  GI ppx: none  Dispo: [facility]  Code status: FULL

## 2024-05-06 NOTE — PROGRESS NOTE ADULT - SUBJECTIVE AND OBJECTIVE BOX
----- INTERVAL HPI/OVERNIGHT EVENTS -----     Patient was seen and examined at bedside. Resting comfortably on NC. Reports breathing issues have much improved and he is urinating with no issues.   From renal standpoint, Cre has remained near 1.2 range for the past few days which appears to be his baseline at this point. He had an US of the kidney done which showed 2 simple cysts of the right kidney.     ----- VITAL SIGNS -----  T(F): 98.2 (05-06-24 @ 11:26)  HR: 116 (05-06-24 @ 11:35)  BP: 147/83 (05-06-24 @ 11:26)  RR: 20 (05-06-24 @ 11:35)  SpO2: 92% (05-06-24 @ 11:35)  Wt(kg): --      05-05-24 @ 07:01  -  05-06-24 @ 07:00  --------------------------------------------------------  IN: 0 mL / OUT: 750 mL / NET: -750 mL        ----- Physical Exam -----     Constitutional: resting comfortably in bed; NAD on NC, speaking full sentences   Respiratory: on NC, CTA B/L; no W/R/R, no retractions  Cardiac: +S1/S2; RRR; no M/R/G appreciated  Gastrointestinal: abdomen soft, NT/ND, +BS, no rebound tenderness or guarding  Extremities; well perfused, no peripheral edema    MEDICATIONS  (STANDING):  amLODIPine   Tablet 10 milliGRAM(s) Oral every 24 hours  atorvastatin 10 milliGRAM(s) Oral at bedtime  budesonide 160 MICROgram(s)/formoterol 4.5 MICROgram(s) Inhaler 2 Puff(s) Inhalation two times a day  dextrose 10% Bolus 125 milliLiter(s) IV Bolus once  dextrose 5%. 1000 milliLiter(s) (50 mL/Hr) IV Continuous <Continuous>  dextrose 5%. 1000 milliLiter(s) (100 mL/Hr) IV Continuous <Continuous>  dextrose 50% Injectable 12.5 Gram(s) IV Push once  dextrose 50% Injectable 25 Gram(s) IV Push once  glucagon  Injectable 1 milliGRAM(s) IntraMuscular once  heparin   Injectable 7500 Unit(s) SubCutaneous every 8 hours  influenza   Vaccine 0.5 milliLiter(s) IntraMuscular once  insulin lispro (ADMELOG) corrective regimen sliding scale   SubCutaneous Before meals and at bedtime  iron sucrose IVPB 300 milliGRAM(s) IV Intermittent every 24 hours  lidocaine   4% Patch 2 Patch Transdermal daily    MEDICATIONS  (PRN):  albuterol    90 MICROgram(s) HFA Inhaler 2 Puff(s) Inhalation every 12 hours PRN Shortness of Breath and/or Wheezing  dextrose Oral Gel 15 Gram(s) Oral once PRN Blood Glucose LESS THAN 70 milliGRAM(s)/deciliter      Allergies    No Known Allergies    Intolerances        ----- LABS -----                         7.4    8.09  )-----------( 300      ( 06 May 2024 05:30 )             31.0     05-06    142  |  101  |  23  ----------------------------<  93  4.4   |  34<H>  |  1.20    Ca    9.0      06 May 2024 05:30  Phos  4.3     05-06  Mg     2.0     05-06    TPro  7.0  /  Alb  3.6  /  TBili  0.3  /  DBili  x   /  AST  14  /  ALT  9<L>  /  AlkPhos  68  05-06      Urinalysis Basic - ( 06 May 2024 05:30 )    Color: x / Appearance: x / SG: x / pH: x  Gluc: 93 mg/dL / Ketone: x  / Bili: x / Urobili: x   Blood: x / Protein: x / Nitrite: x   Leuk Esterase: x / RBC: x / WBC x   Sq Epi: x / Non Sq Epi: x / Bacteria: x          ----- RADIOLOGY & ADDITIONAL TESTS -----     Reviewed

## 2024-05-06 NOTE — DIETITIAN INITIAL EVALUATION ADULT - PERTINENT MEDS FT
MEDICATIONS  (STANDING):  amLODIPine   Tablet 10 milliGRAM(s) Oral every 24 hours  atorvastatin 10 milliGRAM(s) Oral at bedtime  budesonide 160 MICROgram(s)/formoterol 4.5 MICROgram(s) Inhaler 2 Puff(s) Inhalation two times a day  dextrose 10% Bolus 125 milliLiter(s) IV Bolus once  dextrose 5%. 1000 milliLiter(s) (100 mL/Hr) IV Continuous <Continuous>  dextrose 5%. 1000 milliLiter(s) (50 mL/Hr) IV Continuous <Continuous>  dextrose 50% Injectable 12.5 Gram(s) IV Push once  dextrose 50% Injectable 25 Gram(s) IV Push once  glucagon  Injectable 1 milliGRAM(s) IntraMuscular once  heparin   Injectable 7500 Unit(s) SubCutaneous every 8 hours  influenza   Vaccine 0.5 milliLiter(s) IntraMuscular once  insulin lispro (ADMELOG) corrective regimen sliding scale   SubCutaneous Before meals and at bedtime  iron sucrose IVPB 300 milliGRAM(s) IV Intermittent every 24 hours  lidocaine   4% Patch 2 Patch Transdermal daily    MEDICATIONS  (PRN):  albuterol    90 MICROgram(s) HFA Inhaler 2 Puff(s) Inhalation every 12 hours PRN Shortness of Breath and/or Wheezing  dextrose Oral Gel 15 Gram(s) Oral once PRN Blood Glucose LESS THAN 70 milliGRAM(s)/deciliter

## 2024-05-06 NOTE — DIETITIAN INITIAL EVALUATION ADULT - NUTRITON FOCUSED PHYSICAL EXAM
You were evaluated in the Emergency Department.    Based on your evaluation:    There are no signs of emergency conditions requiring admission to the hospital on today's workup.  Based on the evaluation, a presumptive diagnosis was made, however, further evaluation may be required by your primary care physician or a specialist for a more definitive diagnosis.  Therefore, please follow-up as directed or return to the Emergency Department if your symptoms change or worsen.    We recommend that you:  1. See your primary care physician. The hospital should call you to help set up an appointment. If you do not hear from someone by tomorrow, please call the number provided to schedule an appointment.  2. Take Tylenol and/or Ibuprofen every 4-6 hours as needed for pain/headache.  3. Stay hydrated.       *** Return immediately if you have worsening symptoms, fevers, pain, or any other new/concerning symptoms. ***
no...

## 2024-05-06 NOTE — DIETITIAN NUTRITION RISK NOTIFICATION - TREATMENT: THE FOLLOWING DIET HAS BEEN RECOMMENDED
Diet, DASH/TLC:   Sodium & Cholesterol Restricted (05-06-24 @ 12:34) [Pending Verification By Attending]

## 2024-05-06 NOTE — PROGRESS NOTE ADULT - TIME BILLING
case complexity as above
Chart Review. Independent interpretation of diagnostic tests/labs/imaging. Patient encounter. Resident teaching/review.

## 2024-05-06 NOTE — PROGRESS NOTE ADULT - PROBLEM SELECTOR PROBLEM 1
Acute asthma exacerbation

## 2024-05-06 NOTE — PROGRESS NOTE ADULT - PROBLEM SELECTOR PLAN 3
CT chest showed bilateral lower lobe pneumonia in the setting of bronchial impaction. CXR with BL PNA, leukocytosis on admission. s/p CTX and azithromycin. BCX NGTD

## 2024-05-06 NOTE — PROGRESS NOTE ADULT - PROBLEM SELECTOR PLAN 6
Home medication(s): amlodipine 10mg PO qd, losartan-hctz 100-25mg PO qd, metoprolol succinate 100mg PO qd.  - C/w amlodipine 10mg PO qd

## 2024-05-06 NOTE — PROGRESS NOTE ADULT - ATTENDING COMMENTS
59y M with hx of COPD and HTN admitted for LLL pneumonia. Hospital course c/b non oliguric IVANNA w/ peak SCr at 2.2.   Non oliguric IVANNA possibly related to residual effects of previous NSAID use/ diuretics / contrast which has resolved with New BMP showing SCr at 1.2  Incidental 4.7cm renal lesions seen on CT scan is a benign renal cyst confirmed by renal US  Maintain MAP > 65, avoid volume depletion   Avoid NSAIDs   Monitor Renal function post contrast exposure  Will sign off ~ please reconsult as needed    Discussed with primary team.

## 2024-05-06 NOTE — DIETITIAN INITIAL EVALUATION ADULT - ADD RECOMMEND
1. Change diet to DASH/TLC diet as pt has a hx of HTN  - RD will continue to monitor POCT BG and reassess need for consistent carbohydrate diet  - Insulin per team  - Honor food preferences, as medically able  - Appreciate PO intake % documentation in RN flowsheets  2. Appreciate weekly weight trends  3. Ongoing diet education

## 2024-05-06 NOTE — PROGRESS NOTE ADULT - PROBLEM SELECTOR PLAN 4
#R renal cyst, benight.   Cr 1.59 on arrival to ED, repeat 2.06 after 12h, sp lasix & contrast in ED. FeNa 1.9. Cystatin c GFR 26. Renal U/S: No hydronephrosis. Simple appearing right renal cysts. On 5/6, Cr improved to 1.20.   - Following renal recs  - trend cr, follow up urine studies  - renally dose medications, avoid nephrotoxins

## 2024-05-06 NOTE — PROGRESS NOTE ADULT - ASSESSMENT
59y Male with non-oliguric IVANNA in setting of diuretic/ NSAID and contrast use with which is now resolved. Cre has been holding stable around 1.2.     # Nonoliguric IVANNA - now resolved   - Ok to receive contrast for imaging studies   - Monitor Cre post contrast exposure   - Nephro will sign off, please reconsult if needed.     # Right renal simple cyst.   7.1x4.5 cm R midpole simple cyst and 1.8 cm r lower pole simple cyst on US   - Please place a non-urgent out patient nephro consult at the time of discharge  59y Male with non-oliguric IVANNA in setting of diuretic/ NSAID and contrast use with which is now resolved. SCre has been holding stable around 1.2.     # Nonoliguric IVANNA - now resolved   - Ok to receive contrast for imaging studies   - Monitor Cre post contrast exposure   - Nephro will sign off, please reconsult if needed.     # Right renal simple cyst.   7.1x4.5 cm R midpole simple cyst and 1.8 cm r lower pole simple cyst on US   - Please place a non-urgent out patient nephro consult at the time of discharge

## 2024-05-06 NOTE — DIETITIAN INITIAL EVALUATION ADULT - PROBLEM SELECTOR PLAN 3
Cr 1.59 on arrival to ED, repeat 2.06 after 12h, sp lasix & contrast in ED    - trend cr, follow up urine studies  - renally dose medications, avoid nephrotoxins

## 2024-05-06 NOTE — PROGRESS NOTE ADULT - SUBJECTIVE AND OBJECTIVE BOX
PULMONARY CONSULT SERVICE FOLLOW-UP NOTE    INTERVAL HPI:  Reviewed chart and overnight events; patient seen and examined at bedside.   Patients feels like his breathing is improving, though not at 100% yet. Cough has improved.  Less phlegm and wheezing compared to prior.       MEDICATIONS:  Pulmonary:  albuterol    90 MICROgram(s) HFA Inhaler 2 Puff(s) Inhalation every 12 hours PRN  budesonide 160 MICROgram(s)/formoterol 4.5 MICROgram(s) Inhaler 2 Puff(s) Inhalation two times a day    Antimicrobials:    Anticoagulants:  heparin   Injectable 7500 Unit(s) SubCutaneous every 8 hours    Cardiac:  amLODIPine   Tablet 10 milliGRAM(s) Oral every 24 hours      Allergies    No Known Allergies    Intolerances        Vital Signs Last 24 Hrs  T(C): 36.8 (06 May 2024 11:26), Max: 36.8 (05 May 2024 15:38)  T(F): 98.2 (06 May 2024 11:26), Max: 98.2 (05 May 2024 15:38)  HR: 116 (06 May 2024 11:35) (81 - 116)  BP: 147/83 (06 May 2024 11:26) (146/90 - 148/91)  BP(mean): 109 (05 May 2024 20:35) (109 - 110)  RR: 20 (06 May 2024 11:35) (12 - 20)  SpO2: 92% (06 May 2024 11:35) (92% - 100%)    Parameters below as of 06 May 2024 11:35  Patient On (Oxygen Delivery Method): room air        05-05 @ 07:01  -  05-06 @ 07:00  --------------------------------------------------------  IN: 0 mL / OUT: 750 mL / NET: -750 mL          PHYSICAL EXAM:  Constitutional: NAD on NC  HEENT: NC/AT; PERRL, anicteric sclera; MMM  Neck: supple  Cardiovascular: +S1/S2, RRR  Respiratory: Still with wheezing in bilateral lung fields but less prominent compared to prior   Gastrointestinal: soft, NT/ND  Extremities: WWP; no edema, clubbing or cyanosis  Vascular: 2+ radial pulses B/L  Neurological: awake and alert; MARTE    LABS:  ABG - ( 06 May 2024 12:11 )  pH, Arterial: 7.46  pH, Blood: x     /  pCO2: 50    /  pO2: 67    / HCO3: 36    / Base Excess: 10.1  /  SaO2: 96.2                CBC Full  -  ( 06 May 2024 05:30 )  WBC Count : 8.09 K/uL  RBC Count : 4.67 M/uL  Hemoglobin : 7.4 g/dL  Hematocrit : 31.0 %  Platelet Count - Automated : 300 K/uL  Mean Cell Volume : 66.4 fl  Mean Cell Hemoglobin : 15.8 pg  Mean Cell Hemoglobin Concentration : 23.9 gm/dL  Auto Neutrophil # : 5.63 K/uL  Auto Lymphocyte # : 1.47 K/uL  Auto Monocyte # : 0.63 K/uL  Auto Eosinophil # : 0.07 K/uL  Auto Basophil # : 0.07 K/uL  Auto Neutrophil % : 69.6 %  Auto Lymphocyte % : 18.2 %  Auto Monocyte % : 7.8 %  Auto Eosinophil % : 0.9 %  Auto Basophil % : 0.9 %    05-06    142  |  101  |  23  ----------------------------<  93  4.4   |  34<H>  |  1.20    Ca    9.0      06 May 2024 05:30  Phos  4.3     05-06  Mg     2.0     05-06    TPro  7.0  /  Alb  3.6  /  TBili  0.3  /  DBili  x   /  AST  14  /  ALT  9<L>  /  AlkPhos  68  05-06          Urinalysis Basic - ( 06 May 2024 05:30 )    Color: x / Appearance: x / SG: x / pH: x  Gluc: 93 mg/dL / Ketone: x  / Bili: x / Urobili: x   Blood: x / Protein: x / Nitrite: x   Leuk Esterase: x / RBC: x / WBC x   Sq Epi: x / Non Sq Epi: x / Bacteria: x                RADIOLOGY & ADDITIONAL STUDIES:  < from: CT Angio Chest PE Protocol w/ IV Cont (04.30.24 @ 17:14) >  CTA: Suboptimal contrast bolus to the pulmonary arteries. Within study   limitations, no evidence of pulmonary embolism to the proximal segmental   level with limited evaluation of distal segmental and subsegmental   branches. Enlarged pulmonary artery (4 cm) which can be seen with   pulmonary hypertension. For reference, mid ascending aorta measures 3.4   cm. The heart is mildly enlarged. Coronary arterial, aortic valvular   calcification. No pericardial effusion.    Lungs/Airways/Pleura: Respiratory motion artifact and arms down   positioning limited evaluation. There is bronchial impaction with   bilateral lower lobe mixed atelectasis and consolidation. No pleural   effusion or pneumothorax.    Mediastinum/Lymph nodes: Mildly enlarged mediastinal lymph nodes   measuring up to 1.3 cm short axis, likely reactive in the setting.      < end of copied text >

## 2024-05-06 NOTE — DIETITIAN INITIAL EVALUATION ADULT - PROBLEM SELECTOR PLAN 1
AHRF w hypercapnia, spo2 76% on presentation  complaint of several days cough, sob  imaging in ed w bilateral pna  leukocytosis (WBC 11.99)  currently on bipap support  repeat abg w ph 7.3, pco2 68, po2 145, hco3 34  RVP (-)     - continue bipap for now, wean respiratory support as tolerated  - solumedrol 40 iv bid  - duonebs q6h  - ctx & azithromycin for pna  - BCx, urine strep, legionella

## 2024-05-06 NOTE — DIETITIAN INITIAL EVALUATION ADULT - PROBLEM SELECTOR PLAN 6
F: none  E: replete as needed  N: npo for now  VTE ppx: heparin sq  GI ppx: none  [facility]  Code status: FULL

## 2024-05-06 NOTE — DIETITIAN INITIAL EVALUATION ADULT - OTHER INFO
Per H&P: Nathaniel Barney is a 59M, former smoker, PMHx Asthma (denies history of intubations), HTN, and HLD, who presented to [facility] w complaint of SOB in the setting of upper respiratory infection, found to have pneumonia on CXR, hypoxia & hypercapnia on VBG, started on BiPAP support & transferred to [facility] for management of Asthma exacerbation secondary to CAP. Pt reports today's symptoms were preceded by a few days of worsening, productive cough, denies associated fevers, chills, chest pain, nausea, vomiting, and diarrhea.    Met with pt this AM at bedside. Pt was seated upright and able to articulate his nutrition hx well. NKFA nor food intolerances reported. Pt reported good appetite PTA, however has been decreased the last few days due to discomfort and overall not feeling well, however better today. RD observed 100% POs at breakfast this AM. Pt denied dry mouth/mouth sores and chewing/swallowing difficulties. Pt denied nausea/vomiting/diarrhea/constipation, stated last BM 5/5. Pt reported usual body weight ~310lbs a few weeks ago (3% wt loss x few weeks - does not meet criteria for malnutrition). RD reviewed protein sources (chicken, fish, beans, nut, etc.) and encouraged adequate protein consumption in order to regain strength, maintain muscle mass and reach adequate nutritional status. Pt was accepting to RD suggestion and asking appropriate questions.     *Note: Pt on insulin. A1C 5.4% (5/2).  - POCT BG x 24 hours:  POCT Blood Glucose.: 96 mg/dL (06 May 2024 09:11)  POCT Blood Glucose.: 169 mg/dL (05 May 2024 21:53)  POCT Blood Glucose.: 146 mg/dL (05 May 2024 17:20)  POCT Blood Glucose.: 118 mg/dL (05 May 2024 13:07)
no

## 2024-05-06 NOTE — PROGRESS NOTE ADULT - SUBJECTIVE AND OBJECTIVE BOX
OVERNIGHT EVENTS: on BiPAP.     SUBJECTIVE/INTERVAL HPI: Patient was seen and examined at bedside. Pt states his breathing is back to baseline, denies any SOB. Reports his cough has improved even better than his baseline, reports mild white sputum. Denies any headache, dizziness, fever, chest pain, abdominal pain, n/v/d. Pt is eating well.     VITAL SIGNS:  T(F): 98.2 (05-06-24 @ 11:26)  HR: 92 (05-06-24 @ 15:54)  BP: 147/83 (05-06-24 @ 11:26)  RR: 20 (05-06-24 @ 11:35)  SpO2: 93% (05-06-24 @ 15:54)  Wt(kg): --      05-05-24 @ 07:01  -  05-06-24 @ 07:00  --------------------------------------------------------  IN: 0 mL / OUT: 750 mL / NET: -750 mL      PHYSICAL EXAM:  Constitutional: resting comfortably in bed; NAD; NC in place; obese  HEENT: no nasal discharge; MMM  Respiratory: + wheezing noted diffusely   Cardiac: +S1/S2; RRR; no murmurs  Gastrointestinal: soft, NT/ND; no rebound or guarding  Extremities: WWP, no LE edema  Vascular: 2+ radial pulses B/L  Neurologic: AAOx3; no focal deficits      MEDICATIONS  (STANDING):  amLODIPine   Tablet 10 milliGRAM(s) Oral every 24 hours  atorvastatin 10 milliGRAM(s) Oral at bedtime  budesonide 160 MICROgram(s)/formoterol 4.5 MICROgram(s) Inhaler 2 Puff(s) Inhalation two times a day  dextrose 10% Bolus 125 milliLiter(s) IV Bolus once  dextrose 5%. 1000 milliLiter(s) (50 mL/Hr) IV Continuous <Continuous>  dextrose 5%. 1000 milliLiter(s) (100 mL/Hr) IV Continuous <Continuous>  dextrose 50% Injectable 25 Gram(s) IV Push once  dextrose 50% Injectable 12.5 Gram(s) IV Push once  glucagon  Injectable 1 milliGRAM(s) IntraMuscular once  heparin   Injectable 7500 Unit(s) SubCutaneous every 8 hours  influenza   Vaccine 0.5 milliLiter(s) IntraMuscular once  insulin lispro (ADMELOG) corrective regimen sliding scale   SubCutaneous Before meals and at bedtime  iron sucrose IVPB 300 milliGRAM(s) IV Intermittent every 24 hours  lidocaine   4% Patch 2 Patch Transdermal daily    MEDICATIONS  (PRN):  albuterol    90 MICROgram(s) HFA Inhaler 2 Puff(s) Inhalation every 12 hours PRN Shortness of Breath and/or Wheezing  dextrose Oral Gel 15 Gram(s) Oral once PRN Blood Glucose LESS THAN 70 milliGRAM(s)/deciliter      Allergies    No Known Allergies    Intolerances        LABS:                        7.4    8.09  )-----------( 300      ( 06 May 2024 05:30 )             31.0     05-06    142  |  101  |  23  ----------------------------<  93  4.4   |  34<H>  |  1.20    Ca    9.0      06 May 2024 05:30  Phos  4.3     05-06  Mg     2.0     05-06    TPro  7.0  /  Alb  3.6  /  TBili  0.3  /  DBili  x   /  AST  14  /  ALT  9<L>  /  AlkPhos  68  05-06      Urinalysis Basic - ( 06 May 2024 05:30 )    Color: x / Appearance: x / SG: x / pH: x  Gluc: 93 mg/dL / Ketone: x  / Bili: x / Urobili: x   Blood: x / Protein: x / Nitrite: x   Leuk Esterase: x / RBC: x / WBC x   Sq Epi: x / Non Sq Epi: x / Bacteria: x        RADIOLOGY & ADDITIONAL TESTS:  Reviewed

## 2024-05-06 NOTE — DIETITIAN INITIAL EVALUATION ADULT - OTHER CALCULATIONS
*Using +10% ideal body weight as patient is >120% ideal body weight. Needs adjusted for critical illness. ideal body weight: 172LBS, % ideal body weight: 174%.  *Using +10% ideal body weight as patient is >120% ideal body weight. Needs adjusted for COPD. ideal body weight: 172LBS, % ideal body weight: 174%.

## 2024-05-06 NOTE — PROGRESS NOTE ADULT - ATTENDING COMMENTS
As above - admitted for asthma/?COPD exacerbation as pt with extensive smoking history, found to have hypoxic and hypercapnic respiratory failure now improved after steroids, abx, and NIPPV + nebulizers standing. PH on initial TTE in setting of acute exacerbation with predominantly RV pressure overload. Acute setting is suboptimal time to evaluate, however, needs close follow up on discharge and does not have pulmonologist. Lives in [place], cannot follow up with us. Discussed with patient in detail the following issues (described more above): obstructive airway disease and smoking, eosinophilia/IgE elevation, possible sleep apnea and chronic hypercapnia, and pulmonary hypertension.    - Optimize airway disease with triple therapy  - Room air ABG to determine if pt qualifies for home BiPAP  - Follow up completion of eosinophilia work up, suspect significant atopic phenotype as , this is his first exacerbation and does not warrant biologics at this time given acute illness but should strongly be considered in the out patient setting  - Ambulate to determine if pt needs home oxygen, wean oxygen supplementation at rest as tolerated  - Close out patient follow up for evaluation of PH and consideration of RHC, needs referral to local pulm. PCP can refer, please have notes faxed to PCP    No further work up for pulmonary hypertension at this time, PH will sign off. Can call general pulmonology consultation if further questions arise.

## 2024-05-06 NOTE — PROGRESS NOTE ADULT - ASSESSMENT
60 y/o M current smoker with obstructive lung disease presented with worsening dyspnea and productive cough currently admitted to [facility] for acute hypercapnic hypoxic respiratory failure, suspected due to asthma exacerbation in setting of CAP. PH team consulted for PASP 85 on echocardiogram suggestive of pulmonary hypertension. His CT also shows enlarged PA and suggests mosaic attenuation. Notably he has RUCHI which can worsen with overdiuresis and his IVS is D-shaped in systole only, which suggests high pressures rather than volume. On exam he continues to have diffuse wheezing and rhonchi suggesting ongoing airway disease.    Patient certainly at high risk for PH but given his active obstructive airways disease, CAP and hypercapnia, invasive testing for PH will not be reliable at this time, and would be considered if he was not improving despite optimization of these comorbidities. He is slowly improving clinically. He has eosinophilia which is being worked up (outlined below), now resolved after steroids (to be expected). Otherwise would not recommend further diuretics at this time based on ECHO findings supportive more pressure overload than volume, and risk for worsening RUCHI. Notably he needs a PFT, smoking cessation counseling, and is eligible for lung cancer screening, all of which needs to be addressed in the outpatient setting, and he is not planning to follow up here since he lives in [place]. Today he is feeling better but not at 100%. He does note that he was adherent to his symbicort 160/4.5 every morning.     Discussed smoking cessation, he prefers not to use an adjunct like NRT at this time, comments that he had vivid dreams in past using smoking cessation treatment, but appears to be willing to quit. May benefit from varenicline or other options, should be discussed further on follow up. Notably this is the longest time period he has gone without smoking in recent years. Lung cancer screening still needs to be addressed with shared decision making, which is best established in the outpatient setting.    Notably he has elevated IgE >1300 even after steroids. This taken with his eosinophilia on admission signals significant T2 inflammation, likely associated with his obstructive airway disease. At this time we will optimize his inhalers by transitioning to triple therapy LAMA/LABA/ICS.     Plan:  - Eosinophilia workup:       - Serolgoies negative for ANCA, anti-CCP, RF, anti-SSA, still outstanding are MALENA, anti-myomarker panel      - Infectious causes workup still in process:  aspergillosis IgG, serum GM, strongyloides ab, stool for O&P, peripheral blood smear for parasites  - Perform ABG on room air to assess need for home oxygen and CPAP  - Follow up with outpatient pulmonology, unfortunately he is unable to follow up with us. We will fax our records to his PCP  - Records should be faxed to his PCP for continuation of care  - Complete 5 days of steroids  - Can resume symbicort 160/4.5, and add tiotropium inhaler once daily  - Regarding RHC, he is improved clinically with treatment of his obstructive airways disease and pneumonia, therefore unlikely that PH is the primary  of his dyspnea. He should follow up with pulmonary (unable to follow here) for optimization of comorbidities and PH evaluation.     Discharge plan:  - LAMA/LABA/ICS with trelegy once daily or breztri twice daily. If not covered, can continue with symbicort 160/4.5 and add tiotropium  - For primary care/pulmonary: PFT, sleep study, smoking cessation, lung cancer screening enrollment, ongoing evaluation of his PH +/- RHC  - May need home CPAP, based on above results    Patient seen, evaluated and discussed with Dr. Chamorro (Pulmonary Hypertension attending).  Pulmonary hypertension team will sign off at this time.  Please call/page with any new questions.    Percy Rider MD  PCCM Fellow      Patient seen, evaluated and discussed with Dr. Chamorro  PH team will follow    Percy Rider MD  PCCM Fellow 60 y/o M current smoker with obstructive lung disease presented with worsening dyspnea and productive cough currently admitted to [facility] for acute hypercapnic hypoxic respiratory failure, suspected due to asthma exacerbation in setting of CAP. PH team consulted for PASP 85 on echocardiogram suggestive of pulmonary hypertension. His CT also shows enlarged PA and suggests mosaic attenuation. Notably he has RUCHI which can worsen with overdiuresis and his IVS is D-shaped in systole only, which suggests high pressures rather than volume. On exam he continues to have diffuse wheezing and rhonchi suggesting ongoing airway disease.    Patient certainly at high risk for PH but given his active obstructive airways disease, CAP and hypercapnia, invasive testing for PH will not be reliable at this time, and would be considered if he was not improving despite optimization of these comorbidities. He is slowly improving clinically. He has eosinophilia which is being worked up (outlined below), now resolved after steroids (to be expected). Otherwise would not recommend further diuretics at this time based on ECHO findings supportive more pressure overload than volume, and risk for worsening RUCHI. Notably he needs a PFT, smoking cessation counseling, and is eligible for lung cancer screening, all of which needs to be addressed in the outpatient setting, and he is not planning to follow up here since he lives in [place]. Today he is feeling better but not at 100%. He does note that he was adherent to his symbicort 160/4.5 every morning.     Discussed smoking cessation, he prefers not to use an adjunct like NRT at this time, comments that he had vivid dreams in past using smoking cessation treatment, but appears to be willing to quit. May benefit from varenicline or other options, should be discussed further on follow up. Notably this is the longest time period he has gone without smoking in recent years. Lung cancer screening still needs to be addressed with shared decision making, which is best established in the outpatient setting.    Notably he has elevated IgE >1300 even after steroids. This taken with his eosinophilia on admission signals significant T2 inflammation, likely associated with his obstructive airway disease. At this time we will optimize his inhalers by transitioning to triple therapy LAMA/LABA/ICS.     Iron studies also show significant iron deficiency anemia, agree with IV supplementation in the setting of PH and hospitalization, continue with oral supplementation as outpatient. Given severity, he should follow up with PCP to ensure all routine screening completed if not yet done.     Plan:  - Eosinophilia workup:       - Serolgoies negative for ANCA, anti-CCP, RF, anti-SSA, still outstanding are MALENA, anti-myomarker panel      - Infectious causes workup still in process:  aspergillosis IgG, serum GM, strongyloides ab, stool for O&P, peripheral blood smear for parasites  - Perform ABG on room air to assess need for home oxygen and CPAP  - Follow up with outpatient pulmonology, unfortunately he is unable to follow up with us. We will fax our records to his PCP  - Records should be faxed to his PCP for continuation of care  - Complete 5 days of steroids  - Can resume symbicort 160/4.5, and add tiotropium inhaler once daily  - Regarding RHC, he is improved clinically with treatment of his obstructive airways disease and pneumonia, therefore unlikely that PH is the primary  of his dyspnea. He should follow up with pulmonary (unable to follow here) for optimization of comorbidities and PH evaluation.     Discharge plan:  - LAMA/LABA/ICS with trelegy once daily or breztri twice daily. If not covered, can continue with symbicort 160/4.5 and add tiotropium  - For primary care/pulmonary: PFT, sleep study, smoking cessation, lung cancer screening enrollment, ongoing evaluation of his PH +/- RHC  - Significant iron deficiency anemia, ensure routine screening such as colonoscopy addressed   - May need home CPAP, based on above results    Patient seen, evaluated and discussed with Dr. Chamorro (Pulmonary Hypertension attending).  Pulmonary hypertension team will sign off at this time.  Please call/page with any new questions.    Percy Rider MD  PCCM Fellow      Patient seen, evaluated and discussed with Dr. Chamorro  PH team will follow    Percy Rider MD  PCCM Fellow 58 y/o M current smoker with obstructive lung disease presented with worsening dyspnea and productive cough currently admitted to [facility] for acute hypercapnic hypoxic respiratory failure, suspected due to asthma exacerbation in setting of CAP. PH team consulted for PASP 85 on echocardiogram suggestive of pulmonary hypertension. His CT also shows enlarged PA and suggests mosaic attenuation. Notably he has RUCHI which can worsen with overdiuresis and his IVS is D-shaped in systole only, which suggests high pressures rather than volume. On exam he continues to have diffuse wheezing and rhonchi suggesting ongoing airway disease.    Patient certainly at high risk for PH but given his active obstructive airways disease, CAP and hypercapnia, invasive testing for PH will not be reliable at this time, and would be considered if he was not improving despite optimization of these comorbidities. He is slowly improving clinically. He has eosinophilia which is being worked up (outlined below), now resolved after steroids (to be expected). Otherwise would not recommend further diuretics at this time based on ECHO findings supportive more pressure overload than volume, and risk for worsening RUCHI. Notably he needs a PFT, smoking cessation counseling, and is eligible for lung cancer screening, all of which needs to be addressed in the outpatient setting, and he is not planning to follow up here since he lives in [place]. Today he is feeling better but not at 100%. He does note that he was adherent to his symbicort 160/4.5 every morning.     Discussed smoking cessation, he prefers not to use an adjunct like NRT at this time, comments that he had vivid dreams in past using smoking cessation treatment, but appears to be willing to quit. May benefit from varenicline or other options, should be discussed further on follow up. Notably this is the longest time period he has gone without smoking in recent years. Lung cancer screening still needs to be addressed with shared decision making, which is best established in the outpatient setting.    Notably he has elevated IgE >1300 even after steroids. This taken with his eosinophilia on admission signals significant T2 inflammation, likely associated with his obstructive airway disease. At this time we will optimize his inhalers by transitioning to triple therapy LAMA/LABA/ICS.     Iron studies also show significant iron deficiency anemia, agree with IV supplementation in the setting of PH and hospitalization, continue with oral supplementation as outpatient. Given severity, he should follow up with PCP to ensure all routine screening completed if not yet done.     Plan:  - Eosinophilia workup:       - Serologies negative for ANCA, anti-CCP, RF, anti-SSA, still outstanding are MALENA, anti-myomarker panel      - Infectious causes workup still in process:  aspergillosis IgG, serum GM, strongyloides ab, stool for O&P, peripheral blood smear for parasites  - Perform ABG on room air to assess need for home oxygen and CPAP  - Follow up with outpatient pulmonology, unfortunately he is unable to follow up with us. We will fax our records to his PCP  - Records should be faxed to his PCP for continuation of care  - Complete 5 days of steroids  - Can resume symbicort 160/4.5, and add tiotropium inhaler once daily  - Regarding RHC, he is improved clinically with treatment of his obstructive airways disease and pneumonia, therefore unlikely that PH is the primary  of his dyspnea. He should follow up with pulmonary (unable to follow here) for optimization of comorbidities and PH evaluation.     Discharge plan:  - LAMA/LABA/ICS with trelegy once daily or breztri twice daily. If not covered, can continue with symbicort 160/4.5 and add tiotropium  - For primary care/pulmonary: PFT, sleep study, smoking cessation, lung cancer screening enrollment, ongoing evaluation of his PH +/- RHC  - Significant iron deficiency anemia, ensure routine screening such as colonoscopy addressed   - May need home CPAP, based on above results    Patient seen, evaluated and discussed with Dr. Chamorro (Pulmonary Hypertension attending).  Pulmonary hypertension team will sign off at this time.  Please call/page with any new questions.    Percy Rider MD  PCCM Fellow

## 2024-05-06 NOTE — PROGRESS NOTE ADULT - ASSESSMENT
59M, former smoker, PMHx Asthma (denies history of intubations), HTN, HLD, presented w SOB, found to have pneumonia on CXR, hypoxia & hypercapnia on VBG, started on BiPAP support & admitted for management of Asthma/COPD/OHS exacerbation secondary to CAP. Now stepped down from [facility]ach to RMF

## 2024-05-06 NOTE — DIETITIAN INITIAL EVALUATION ADULT - PERTINENT LABORATORY DATA
05-06    142  |  101  |  23  ----------------------------<  93  4.4   |  34<H>  |  1.20    Ca    9.0      06 May 2024 05:30  Phos  4.3     05-06  Mg     2.0     05-06    TPro  7.0  /  Alb  3.6  /  TBili  0.3  /  DBili  x   /  AST  14  /  ALT  9<L>  /  AlkPhos  68  05-06  POCT Blood Glucose.: 96 mg/dL (05-06-24 @ 09:11)  A1C with Estimated Average Glucose Result: 5.4 % (05-02-24 @ 05:30)

## 2024-05-06 NOTE — PROGRESS NOTE ADULT - PROBLEM SELECTOR PLAN 5
#Iron deficiency anemia.   Pt presents with hemoglobin 7.6. Unclear etiology. Iron studies c/w iron deficiency.  - Started iron sucrose IV x3 days while inpatient -> will discharge on PO iron supplementation   - F/u outpatient for age appropriate screening

## 2024-05-07 VITALS — HEART RATE: 90 BPM | SYSTOLIC BLOOD PRESSURE: 154 MMHG | DIASTOLIC BLOOD PRESSURE: 90 MMHG

## 2024-05-07 LAB
A FLAVUS AB FLD QL: NEGATIVE — SIGNIFICANT CHANGE UP
A NIGER AB FLD QL: NEGATIVE — SIGNIFICANT CHANGE UP
A NIGER AB FLD QL: NEGATIVE — SIGNIFICANT CHANGE UP
ANION GAP SERPL CALC-SCNC: 8 MMOL/L — SIGNIFICANT CHANGE UP (ref 5–17)
BASOPHILS # BLD AUTO: 0.03 K/UL — SIGNIFICANT CHANGE UP (ref 0–0.2)
BASOPHILS NFR BLD AUTO: 0.4 % — SIGNIFICANT CHANGE UP (ref 0–2)
BUN SERPL-MCNC: 24 MG/DL — HIGH (ref 7–23)
CALCIUM SERPL-MCNC: 9 MG/DL — SIGNIFICANT CHANGE UP (ref 8.4–10.5)
CHLORIDE SERPL-SCNC: 99 MMOL/L — SIGNIFICANT CHANGE UP (ref 96–108)
CO2 SERPL-SCNC: 31 MMOL/L — SIGNIFICANT CHANGE UP (ref 22–31)
CREAT SERPL-MCNC: 1.06 MG/DL — SIGNIFICANT CHANGE UP (ref 0.5–1.3)
EGFR: 80 ML/MIN/1.73M2 — SIGNIFICANT CHANGE UP
EOSINOPHIL # BLD AUTO: 0.29 K/UL — SIGNIFICANT CHANGE UP (ref 0–0.5)
EOSINOPHIL NFR BLD AUTO: 3.5 % — SIGNIFICANT CHANGE UP (ref 0–6)
GLUCOSE BLDC GLUCOMTR-MCNC: 107 MG/DL — HIGH (ref 70–99)
GLUCOSE BLDC GLUCOMTR-MCNC: 152 MG/DL — HIGH (ref 70–99)
GLUCOSE SERPL-MCNC: 104 MG/DL — HIGH (ref 70–99)
HCT VFR BLD CALC: 29.9 % — LOW (ref 39–50)
HGB BLD-MCNC: 7.3 G/DL — LOW (ref 13–17)
IMM GRANULOCYTES NFR BLD AUTO: 0.8 % — SIGNIFICANT CHANGE UP (ref 0–0.9)
LYMPHOCYTES # BLD AUTO: 1.83 K/UL — SIGNIFICANT CHANGE UP (ref 1–3.3)
LYMPHOCYTES # BLD AUTO: 22.2 % — SIGNIFICANT CHANGE UP (ref 13–44)
MAGNESIUM SERPL-MCNC: 1.8 MG/DL — SIGNIFICANT CHANGE UP (ref 1.6–2.6)
MCHC RBC-ENTMCNC: 15.9 PG — LOW (ref 27–34)
MCHC RBC-ENTMCNC: 24.4 GM/DL — LOW (ref 32–36)
MCV RBC AUTO: 65.3 FL — LOW (ref 80–100)
MONOCYTES # BLD AUTO: 1.13 K/UL — HIGH (ref 0–0.9)
MONOCYTES NFR BLD AUTO: 13.7 % — SIGNIFICANT CHANGE UP (ref 2–14)
NEUTROPHILS # BLD AUTO: 4.9 K/UL — SIGNIFICANT CHANGE UP (ref 1.8–7.4)
NEUTROPHILS NFR BLD AUTO: 59.4 % — SIGNIFICANT CHANGE UP (ref 43–77)
NRBC # BLD: 0 /100 WBCS — SIGNIFICANT CHANGE UP (ref 0–0)
PHOSPHATE SERPL-MCNC: 4.1 MG/DL — SIGNIFICANT CHANGE UP (ref 2.5–4.5)
PLATELET # BLD AUTO: 306 K/UL — SIGNIFICANT CHANGE UP (ref 150–400)
POTASSIUM SERPL-MCNC: 4.3 MMOL/L — SIGNIFICANT CHANGE UP (ref 3.5–5.3)
POTASSIUM SERPL-SCNC: 4.3 MMOL/L — SIGNIFICANT CHANGE UP (ref 3.5–5.3)
RBC # BLD: 4.58 M/UL — SIGNIFICANT CHANGE UP (ref 4.2–5.8)
RBC # FLD: 25.6 % — HIGH (ref 10.3–14.5)
SODIUM SERPL-SCNC: 138 MMOL/L — SIGNIFICANT CHANGE UP (ref 135–145)
WBC # BLD: 8.25 K/UL — SIGNIFICANT CHANGE UP (ref 3.8–10.5)
WBC # FLD AUTO: 8.25 K/UL — SIGNIFICANT CHANGE UP (ref 3.8–10.5)

## 2024-05-07 PROCEDURE — 83735 ASSAY OF MAGNESIUM: CPT

## 2024-05-07 PROCEDURE — 83036 HEMOGLOBIN GLYCOSYLATED A1C: CPT

## 2024-05-07 PROCEDURE — 99285 EMERGENCY DEPT VISIT HI MDM: CPT | Mod: 25

## 2024-05-07 PROCEDURE — 80053 COMPREHEN METABOLIC PANEL: CPT

## 2024-05-07 PROCEDURE — 83605 ASSAY OF LACTIC ACID: CPT

## 2024-05-07 PROCEDURE — 82610 CYSTATIN C: CPT

## 2024-05-07 PROCEDURE — 80048 BASIC METABOLIC PNL TOTAL CA: CPT

## 2024-05-07 PROCEDURE — 93307 TTE W/O DOPPLER COMPLETE: CPT

## 2024-05-07 PROCEDURE — 82330 ASSAY OF CALCIUM: CPT

## 2024-05-07 PROCEDURE — 71275 CT ANGIOGRAPHY CHEST: CPT | Mod: MC

## 2024-05-07 PROCEDURE — 82570 ASSAY OF URINE CREATININE: CPT

## 2024-05-07 PROCEDURE — 85025 COMPLETE CBC W/AUTO DIFF WBC: CPT

## 2024-05-07 PROCEDURE — 84145 PROCALCITONIN (PCT): CPT

## 2024-05-07 PROCEDURE — 86038 ANTINUCLEAR ANTIBODIES: CPT

## 2024-05-07 PROCEDURE — 84466 ASSAY OF TRANSFERRIN: CPT

## 2024-05-07 PROCEDURE — 84300 ASSAY OF URINE SODIUM: CPT

## 2024-05-07 PROCEDURE — 86682 HELMINTH ANTIBODY: CPT

## 2024-05-07 PROCEDURE — 86200 CCP ANTIBODY: CPT

## 2024-05-07 PROCEDURE — 97110 THERAPEUTIC EXERCISES: CPT

## 2024-05-07 PROCEDURE — 96374 THER/PROPH/DIAG INJ IV PUSH: CPT

## 2024-05-07 PROCEDURE — 87305 ASPERGILLUS AG IA: CPT

## 2024-05-07 PROCEDURE — 97161 PT EVAL LOW COMPLEX 20 MIN: CPT

## 2024-05-07 PROCEDURE — 84443 ASSAY THYROID STIM HORMONE: CPT

## 2024-05-07 PROCEDURE — 86850 RBC ANTIBODY SCREEN: CPT

## 2024-05-07 PROCEDURE — 87637 SARSCOV2&INF A&B&RSV AMP PRB: CPT

## 2024-05-07 PROCEDURE — 94660 CPAP INITIATION&MGMT: CPT

## 2024-05-07 PROCEDURE — 82728 ASSAY OF FERRITIN: CPT

## 2024-05-07 PROCEDURE — 97530 THERAPEUTIC ACTIVITIES: CPT

## 2024-05-07 PROCEDURE — 84540 ASSAY OF URINE/UREA-N: CPT

## 2024-05-07 PROCEDURE — 82962 GLUCOSE BLOOD TEST: CPT

## 2024-05-07 PROCEDURE — 83540 ASSAY OF IRON: CPT

## 2024-05-07 PROCEDURE — 84100 ASSAY OF PHOSPHORUS: CPT

## 2024-05-07 PROCEDURE — 84295 ASSAY OF SERUM SODIUM: CPT

## 2024-05-07 PROCEDURE — 82785 ASSAY OF IGE: CPT

## 2024-05-07 PROCEDURE — 86901 BLOOD TYPING SEROLOGIC RH(D): CPT

## 2024-05-07 PROCEDURE — 97165 OT EVAL LOW COMPLEX 30 MIN: CPT

## 2024-05-07 PROCEDURE — 83520 IMMUNOASSAY QUANT NOS NONAB: CPT

## 2024-05-07 PROCEDURE — 84132 ASSAY OF SERUM POTASSIUM: CPT

## 2024-05-07 PROCEDURE — 86036 ANCA SCREEN EACH ANTIBODY: CPT

## 2024-05-07 PROCEDURE — 71045 X-RAY EXAM CHEST 1 VIEW: CPT

## 2024-05-07 PROCEDURE — 93005 ELECTROCARDIOGRAM TRACING: CPT

## 2024-05-07 PROCEDURE — 86606 ASPERGILLUS ANTIBODY: CPT

## 2024-05-07 PROCEDURE — 97535 SELF CARE MNGMENT TRAINING: CPT

## 2024-05-07 PROCEDURE — 83880 ASSAY OF NATRIURETIC PEPTIDE: CPT

## 2024-05-07 PROCEDURE — 86235 NUCLEAR ANTIGEN ANTIBODY: CPT

## 2024-05-07 PROCEDURE — 99233 SBSQ HOSP IP/OBS HIGH 50: CPT | Mod: GC

## 2024-05-07 PROCEDURE — 85730 THROMBOPLASTIN TIME PARTIAL: CPT

## 2024-05-07 PROCEDURE — 84133 ASSAY OF URINE POTASSIUM: CPT

## 2024-05-07 PROCEDURE — 87040 BLOOD CULTURE FOR BACTERIA: CPT

## 2024-05-07 PROCEDURE — 86140 C-REACTIVE PROTEIN: CPT

## 2024-05-07 PROCEDURE — 76770 US EXAM ABDO BACK WALL COMP: CPT

## 2024-05-07 PROCEDURE — 83516 IMMUNOASSAY NONANTIBODY: CPT

## 2024-05-07 PROCEDURE — 87899 AGENT NOS ASSAY W/OPTIC: CPT

## 2024-05-07 PROCEDURE — 97116 GAIT TRAINING THERAPY: CPT

## 2024-05-07 PROCEDURE — 81001 URINALYSIS AUTO W/SCOPE: CPT

## 2024-05-07 PROCEDURE — 86900 BLOOD TYPING SEROLOGIC ABO: CPT

## 2024-05-07 PROCEDURE — 85610 PROTHROMBIN TIME: CPT

## 2024-05-07 PROCEDURE — 94640 AIRWAY INHALATION TREATMENT: CPT

## 2024-05-07 PROCEDURE — 85045 AUTOMATED RETICULOCYTE COUNT: CPT

## 2024-05-07 PROCEDURE — 85027 COMPLETE CBC AUTOMATED: CPT

## 2024-05-07 PROCEDURE — 82803 BLOOD GASES ANY COMBINATION: CPT

## 2024-05-07 PROCEDURE — 36415 COLL VENOUS BLD VENIPUNCTURE: CPT

## 2024-05-07 PROCEDURE — 87449 NOS EACH ORGANISM AG IA: CPT

## 2024-05-07 PROCEDURE — 83550 IRON BINDING TEST: CPT

## 2024-05-07 PROCEDURE — 84484 ASSAY OF TROPONIN QUANT: CPT

## 2024-05-07 RX ORDER — TIOTROPIUM BROMIDE 18 UG/1
1 CAPSULE ORAL; RESPIRATORY (INHALATION)
Qty: 1 | Refills: 0
Start: 2024-05-07 | End: 2024-06-05

## 2024-05-07 RX ORDER — BUDESONIDE AND FORMOTEROL FUMARATE DIHYDRATE 160; 4.5 UG/1; UG/1
2 AEROSOL RESPIRATORY (INHALATION)
Refills: 0 | DISCHARGE

## 2024-05-07 RX ORDER — FLUTICASONE FUROATE, UMECLIDINIUM BROMIDE AND VILANTEROL TRIFENATATE 200; 62.5; 25 UG/1; UG/1; UG/1
1 POWDER RESPIRATORY (INHALATION)
Qty: 1 | Refills: 2
Start: 2024-05-07 | End: 2024-08-04

## 2024-05-07 RX ORDER — BUDESONIDE AND FORMOTEROL FUMARATE DIHYDRATE 160; 4.5 UG/1; UG/1
2 AEROSOL RESPIRATORY (INHALATION)
Qty: 1 | Refills: 0
Start: 2024-05-07 | End: 2024-06-05

## 2024-05-07 RX ADMIN — BUDESONIDE AND FORMOTEROL FUMARATE DIHYDRATE 2 PUFF(S): 160; 4.5 AEROSOL RESPIRATORY (INHALATION) at 06:42

## 2024-05-07 RX ADMIN — Medication 1: at 13:46

## 2024-05-07 RX ADMIN — LIDOCAINE 2 PATCH: 4 CREAM TOPICAL at 11:39

## 2024-05-07 RX ADMIN — HEPARIN SODIUM 7500 UNIT(S): 5000 INJECTION INTRAVENOUS; SUBCUTANEOUS at 16:36

## 2024-05-07 RX ADMIN — AMLODIPINE BESYLATE 10 MILLIGRAM(S): 2.5 TABLET ORAL at 16:36

## 2024-05-07 RX ADMIN — HEPARIN SODIUM 7500 UNIT(S): 5000 INJECTION INTRAVENOUS; SUBCUTANEOUS at 06:42

## 2024-05-07 RX ADMIN — LIDOCAINE 2 PATCH: 4 CREAM TOPICAL at 06:00

## 2024-05-07 RX ADMIN — TIOTROPIUM BROMIDE AND OLODATEROL 2 PUFF(S): 3.124; 2.736 SPRAY, METERED RESPIRATORY (INHALATION) at 11:39

## 2024-05-07 RX ADMIN — IRON SUCROSE 176.67 MILLIGRAM(S): 20 INJECTION, SOLUTION INTRAVENOUS at 09:13

## 2024-05-07 NOTE — DISCHARGE NOTE PROVIDER - NSDCMRMEDTOKEN_GEN_ALL_CORE_FT
Albuterol (Eqv-ProAir HFA) 90 mcg/inh inhalation aerosol: 2 puff(s) inhaled once a day  amLODIPine 10 mg oral tablet: 1 tab(s) orally once a day  atorvastatin 10 mg oral tablet: 1 tab(s) orally once a day (at bedtime)  ferrous sulfate 325 mg (65 mg elemental iron) oral tablet: 1 tab(s) orally every other day  losartan-hydroCHLOROthiazide 100 mg-25 mg oral tablet: 1 tab(s) orally once a day  metoprolol succinate 100 mg oral tablet, extended release: 1 tab(s) orally once a day  Trelegy Ellipta 100 mcg-62.5 mcg-25 mcg/inh inhalation powder: 1 puff(s) inhaled once a day

## 2024-05-07 NOTE — DISCHARGE NOTE PROVIDER - ATTENDING DISCHARGE PHYSICAL EXAMINATION:
Gen: NAD, resting in bed  HEENT: EOMI, PERRL, no scleral icterus  CV: normal S1 and S2  Lungs: mild wheezing bilaterally but significantly improved since yesterday, normal respiratory effort on RA   Ab: obese, soft, NT, ND, normal BS  Ext: no LE edema   Neuro: no focal deficits     59-year-old male with a PMHx of asthma/COPD and suspected OHS who presented with acute hypoxic/hypercapnic respiratory failure 2/2 acute asthma exacerbation 2/2 CAP requiring NIPPV for which he was initially admitted to telemetry, now stable on RMF.       Plan:     -pulmonology consulted, started on triple therapy (Trelegy vs. Symbicort & Tiotropium pending insurance coverage), s/p steroid and Abx course   -needs outpatient follow up for sleep study, PFTs, CT-Chest +/- RHC (will follow up with pulmonologist in [place])  -successfully weaned off oxygen, ambulatory sat 91-92% on RA  -continue with iron supplementation upon discharge, needs outpatient follow up for colonoscopy       Rest of plan as outlined above.

## 2024-05-07 NOTE — DISCHARGE NOTE PROVIDER - NSDCCPTREATMENT_GEN_ALL_CORE_FT
PRINCIPAL PROCEDURE  Procedure: Transthoracic echo  Findings and Treatment: 1. Hyperdynamic left ventricular systolic function. EF 80%.    2. D-shaped flattening of interventricular septum due to right   ventricular pressure overload.   3. Normal right ventricular size and systolic function.   4. Mildly dilated left atrium.   5. Aortic sclerosis without significant stenosis.   6. Systolic anterior motion of the mitral valve resulting in an LVOT   gradient of 17.00 mmHg and mild LVOT obstruction. The LVOT gradient is   49.00 mmHg with Valsalva maneuver.   7. Pulmonary hypertension present, pulmonary artery systolic pressure is   85 mmHg.   8. No pericardial effusion.   9. No prior echo is available for comparison.

## 2024-05-07 NOTE — PROGRESS NOTE ADULT - PROVIDER SPECIALTY LIST ADULT
Internal Medicine
Nephrology
Internal Medicine
Pulmonology
Nephrology
Internal Medicine

## 2024-05-07 NOTE — DISCHARGE NOTE PROVIDER - PROVIDER TOKENS
FREE:[LAST:[Jerri],FIRST:[Irwin],PHONE:[(   )    -],FAX:[(   )    -],ADDRESS:[Irwin Guthrie MD  [address] [place] [address] · [address]  [phone]],SCHEDULEDAPPT:[05/09/2024],SCHEDULEDAPPTTIME:[04:50 PM]]

## 2024-05-07 NOTE — DISCHARGE NOTE PROVIDER - NSDCHC_MEDRECSTATUS_GEN_ALL_CORE
[Mother] : mother [Initial Consultation] : an initial consultation for [FreeTextEntry2] : recurrent throat and ear infections  Admission Reconciliation is Completed  Discharge Reconciliation is Completed

## 2024-05-07 NOTE — DISCHARGE NOTE NURSING/CASE MANAGEMENT/SOCIAL WORK - PATIENT PORTAL LINK FT
You can access the FollowMyHealth Patient Portal offered by [facility] by registering at the following website: [email or web address]. By joining [facility]’s FollowMyHealth portal, you will also be able to view your health information using other applications (apps) compatible with our system.

## 2024-05-07 NOTE — DISCHARGE NOTE NURSING/CASE MANAGEMENT/SOCIAL WORK - NSDCPEFALRISK_GEN_ALL_CORE
For information on Fall & Injury Prevention, visit: [email or web address] OR  [email or web address] OR  https://www.cdc.gov/steadi/patient.html

## 2024-05-07 NOTE — DISCHARGE NOTE PROVIDER - DETAILS OF MALNUTRITION DIAGNOSIS/DIAGNOSES
This patient has been assessed with a concern for Malnutrition and was treated during this hospitalization for the following Nutrition diagnosis/diagnoses:     -  05/06/2024: Morbid obesity (BMI > 40)

## 2024-05-07 NOTE — DISCHARGE NOTE NURSING/CASE MANAGEMENT/SOCIAL WORK - NSDCPEEMAIL_GEN_ALL_CORE
[facility] for Tobacco Control email [email or web address]

## 2024-05-07 NOTE — DISCHARGE NOTE PROVIDER - NSDCFUADDAPPT_GEN_ALL_CORE_FT
- Please follow up with Dr. Gayle on 5/9/24 at 4:50PM. Please bring your discharge paperwork to your appointment.

## 2024-05-07 NOTE — PROGRESS NOTE ADULT - NUTRITIONAL ASSESSMENT
This patient has been assessed with a concern for Malnutrition and has been determined to have a diagnosis/diagnoses of Morbid obesity (BMI > 40).    This patient is being managed with:   Diet Regular-  Entered: May  1 2024 11:28AM    The following pending diet order is being considered for treatment of Morbid obesity (BMI > 40):  Diet DASH/TLC-  Sodium & Cholesterol Restricted  Entered: May  6 2024 12:34PM

## 2024-05-07 NOTE — PROGRESS NOTE ADULT - REASON FOR ADMISSION
Asthma Exacerbation

## 2024-05-07 NOTE — DISCHARGE NOTE PROVIDER - CARE PROVIDER_API CALL
Irwin Guthrie MD  [address] [place] [id number] [address]  [phone]  Phone: (   )    -  Fax: (   )    -  Scheduled Appointment: 05/09/2024 04:50 PM

## 2024-05-07 NOTE — DISCHARGE NOTE PROVIDER - HOSPITAL COURSE
#Discharge: do not delete    Patient is 59-year-old male, former smoker, with Asthma (denies history of intubations), HTN, HLD, presented with SOB, found to have pneumonia on CXR, hypoxia & hypercapnia on VBG, started on BiPAP support & admitted for management of acute hypoxic/hypercapnic respiratory failure secondary to CAP and asthma exacerbation. Pt's breathing has improved. Now back to baseline. Pulmonology started pt on Trelegy. Also found to have iron deficiency anemia, s/o iron infusion, will be discharged on iron supplements, needs follow up with PCP for colonoscopy.     Hospital course (by problem):   #Acute hypoxic and hypercapnia respiratory failure, resolved.  P/w spo2 76% on presentation 2/2 asthma/COPD exacerbation vs OHS. CXR w bilateral pna, leukocytosis (WBC 11.99) currently on bipap support. repeat abg w ph 7.3, pco2 68, po2 145, hco3 34. RVP (-). Legionella (-). TTE with normal EF but with PH. HFNC weaned to 2L NC to RA on 5/6.   - Will discharge on Trelegy 1 puff qd per pulm recs.    #Pulmonary hypertension.   S/p TTE with pulmonary hypertension present, pulmonary artery systolic pressure is 85 mmHg. S/p lasix 40 bid. Pulm consulted. Likely multifactorial, high suspicion for group III disease (obesity, some signs/sx of BLANCA, and known smoking hx with likely significant airway disease), underlying hypoxia and hypercapnia will worsen PH as well as acute infection.  - Follow up with outpatient pulmonology for possible RHC    #Pneumonia.   CT chest showed bilateral lower lobe pneumonia in the setting of bronchial impaction. CXR with BL PNA, leukocytosis on admission. s/p CTX and azithromycin. BCX NGTD.    #IVANNA (acute kidney injury), resolved.   #R renal cyst, benight.   Cr 1.59 on arrival to ED, repeat 2.06 after 12h, sp lasix & contrast in ED. FeNa 1.9. Cystatin c GFR 26. Renal U/S: No hydronephrosis. Simple appearing right renal cysts. On 5/6, Cr improved to 1.06.     #Iron deficiency anemia.   Pt presents with hemoglobin 7.6. Unclear etiology. Iron studies c/w iron deficiency. S/p iron infusion.  - Start PO iron supplementation   - F/u outpatient for colonoscopy referral    #Hypertension.   Home medication(s): amlodipine 10mg PO qd, losartan-hctz 100-25mg PO qd, metoprolol succinate 100mg PO qd.  - C/w home meds    #Hyperlipidemia.   On home lipitor 10mg PO qd.  - C/w home med.    Patient was discharged to: home    New medications: Trelegy and ferrous sulfate  Medications that were stopped: Symbicort     Items to follow up as outpatient:  1. PCP   2. Pulm    Physical exam at the time of discharge:  Constitutional: resting comfortably in bed; NAD; obese  HEENT: no nasal discharge; MMM  Respiratory: + expiratory wheezing  Cardiac: +S1/S2; RRR; no murmurs  Gastrointestinal: soft, NT/ND; no rebound or guarding  Extremities: WWP, no LE edema  Vascular: 2+ radial pulses B/L  Neurologic: AAOx3; no focal deficits

## 2024-05-07 NOTE — DISCHARGE NOTE NURSING/CASE MANAGEMENT/SOCIAL WORK - NSDCPEWEB_GEN_ALL_CORE
[facility] for Tobacco Control website --- [email or web address] website --- [email or web address]

## 2024-05-07 NOTE — DISCHARGE NOTE PROVIDER - NSDCCPCAREPLAN_GEN_ALL_CORE_FT
PRINCIPAL DISCHARGE DIAGNOSIS  Diagnosis: Acute respiratory failure with hypoxia and hypercapnia  Assessment and Plan of Treatment:       SECONDARY DISCHARGE DIAGNOSES  Diagnosis: CO2 retention  Assessment and Plan of Treatment:      PRINCIPAL DISCHARGE DIAGNOSIS  Diagnosis: Acute respiratory failure with hypoxia and hypercapnia  Assessment and Plan of Treatment: You presented to the hospital for shortness of breath. You were found to have pneumonia and were treated with antibiotics while you were in the hospital. You were given supplemental oxygen and were placed on a BiPAP to help your breathing and oxygenation. Your breathing has significantly improved. You were evaluated by our pulmonology team which started you on an inhaler called Trelegy.  - Please start taking 1 puff of Trelegy once a day daily  - Please follow up with your primary care physician as scheduled and obtain a referral to a pulmonologist in your area      SECONDARY DISCHARGE DIAGNOSES  Diagnosis: LEI (iron deficiency anemia)  Assessment and Plan of Treatment: You were also found to have iron deficiency anemia during your hospitalization. We gave you iron infusion while inpatient.  - Please start taking iron (ferrous sulfate) one tablet every other day   - Please follow up with your primary care physician for further management and referral for a colonoscopy screening.

## 2024-05-09 LAB — GALACTOMANNAN AG SERPL-ACNC: 0.92 INDEX — HIGH (ref 0–0.49)

## 2024-05-25 LAB
EJ: NEGATIVE — SIGNIFICANT CHANGE UP
ENA JO1 AB SER IA-ACNC: <20 UNITS — SIGNIFICANT CHANGE UP
ENA PM/SCL AB SER-ACNC: <20 UNITS — SIGNIFICANT CHANGE UP
ENA SM+RNP AB SER IA-ACNC: <20 UNITS — SIGNIFICANT CHANGE UP
ENA SS-A IGG SER QL: <20 UNITS — SIGNIFICANT CHANGE UP
FIBRILLARIN AB SER QL: NEGATIVE — SIGNIFICANT CHANGE UP
KU AB SER QL: NEGATIVE — SIGNIFICANT CHANGE UP
MDA-5 (P140)(CADM-140): <20 UNITS — SIGNIFICANT CHANGE UP
MI2 AB SER QL: NEGATIVE — SIGNIFICANT CHANGE UP
NXP-2 (P140): <20 UNITS — SIGNIFICANT CHANGE UP
OJ AB SER QL: NEGATIVE — SIGNIFICANT CHANGE UP
PL12 AB SER QL: NEGATIVE — SIGNIFICANT CHANGE UP
PL7 AB SER QL: NEGATIVE — SIGNIFICANT CHANGE UP
SRP AB SERPL QL: NEGATIVE — SIGNIFICANT CHANGE UP
TIF GAMMA (P155/140): <20 UNITS — SIGNIFICANT CHANGE UP
U2 SNRNP AB SER QL: NEGATIVE — SIGNIFICANT CHANGE UP

## 2024-05-25 NOTE — PHYSICAL THERAPY INITIAL EVALUATION ADULT - LEVEL OF INDEPENDENCE: SUPINE/SIT, REHAB EVAL
Spoke with pt and notified of negative strep results. IS aware to continue care plan discussed here in office and follow up with PCP or return with any new/worsening symptoms    supervision

## 2024-10-14 NOTE — PROGRESS NOTE ADULT - PROBLEM SELECTOR PLAN 2
Received for from [facility] for patient needing to be filled out and signed. Patient needs titers ordered and Quantiferon gold as well. Patient also needs influenza vaccine.     Labs pended.     Please review and advise.   - continue CTX and azithromycin as above